# Patient Record
Sex: MALE | Race: WHITE | NOT HISPANIC OR LATINO | Employment: FULL TIME | URBAN - NONMETROPOLITAN AREA
[De-identification: names, ages, dates, MRNs, and addresses within clinical notes are randomized per-mention and may not be internally consistent; named-entity substitution may affect disease eponyms.]

---

## 2018-03-08 ENCOUNTER — DOCUMENTATION ONLY (OUTPATIENT)
Dept: FAMILY MEDICINE | Facility: OTHER | Age: 53
End: 2018-03-08

## 2018-03-08 PROBLEM — H71.90 CHOLESTEATOMA: Status: ACTIVE | Noted: 2018-03-08

## 2018-03-08 PROBLEM — B07.9 VIRAL WARTS: Status: ACTIVE | Noted: 2018-03-08

## 2019-07-16 DIAGNOSIS — Z20.818 PERTUSSIS EXPOSURE: Primary | ICD-10-CM

## 2019-07-16 RX ORDER — AZITHROMYCIN 250 MG/1
TABLET, FILM COATED ORAL
Qty: 6 TABLET | Refills: 0 | Status: SHIPPED | OUTPATIENT
Start: 2019-07-16 | End: 2019-07-21

## 2019-07-16 NOTE — TELEPHONE ENCOUNTER
Stephanie Jasmine requested phone note to get antibiotic to family members exposed to whooping cough. Uses [facility]. Alisha Lopez LPN ....................7/16/2019  1:33 PM

## 2019-07-17 NOTE — TELEPHONE ENCOUNTER
Sent azithromycin to the pharmacy for treatment.   Please verify if he has any medication allergies and document allergies.   Darling Jasmine PA-C..................7/16/2019 9:38 PM

## 2020-07-23 ENCOUNTER — OFFICE VISIT (OUTPATIENT)
Dept: PEDIATRICS | Facility: OTHER | Age: 55
End: 2020-07-23
Attending: INTERNAL MEDICINE
Payer: COMMERCIAL

## 2020-07-23 VITALS
WEIGHT: 249.5 LBS | TEMPERATURE: 98.4 F | RESPIRATION RATE: 16 BRPM | DIASTOLIC BLOOD PRESSURE: 72 MMHG | OXYGEN SATURATION: 95 % | HEART RATE: 88 BPM | BODY MASS INDEX: 32.03 KG/M2 | SYSTOLIC BLOOD PRESSURE: 112 MMHG

## 2020-07-23 DIAGNOSIS — F41.1 GAD (GENERALIZED ANXIETY DISORDER): Primary | ICD-10-CM

## 2020-07-23 DIAGNOSIS — F41.0 PANIC ATTACK: ICD-10-CM

## 2020-07-23 PROCEDURE — 99214 OFFICE O/P EST MOD 30 MIN: CPT | Performed by: INTERNAL MEDICINE

## 2020-07-23 RX ORDER — FLUOXETINE 10 MG/1
10 CAPSULE ORAL DAILY
Qty: 90 CAPSULE | Refills: 3 | Status: SHIPPED | OUTPATIENT
Start: 2020-07-23 | End: 2020-09-17

## 2020-07-23 RX ORDER — LORAZEPAM 0.5 MG/1
0.5 TABLET ORAL DAILY PRN
Qty: 5 TABLET | Refills: 0 | Status: SHIPPED | OUTPATIENT
Start: 2020-07-23 | End: 2021-01-04

## 2020-07-23 ASSESSMENT — ANXIETY QUESTIONNAIRES
IF YOU CHECKED OFF ANY PROBLEMS ON THIS QUESTIONNAIRE, HOW DIFFICULT HAVE THESE PROBLEMS MADE IT FOR YOU TO DO YOUR WORK, TAKE CARE OF THINGS AT HOME, OR GET ALONG WITH OTHER PEOPLE: VERY DIFFICULT
7. FEELING AFRAID AS IF SOMETHING AWFUL MIGHT HAPPEN: NOT AT ALL
1. FEELING NERVOUS, ANXIOUS, OR ON EDGE: NEARLY EVERY DAY
6. BECOMING EASILY ANNOYED OR IRRITABLE: NEARLY EVERY DAY
5. BEING SO RESTLESS THAT IT IS HARD TO SIT STILL: NEARLY EVERY DAY
3. WORRYING TOO MUCH ABOUT DIFFERENT THINGS: SEVERAL DAYS
GAD7 TOTAL SCORE: 14
2. NOT BEING ABLE TO STOP OR CONTROL WORRYING: SEVERAL DAYS

## 2020-07-23 ASSESSMENT — PAIN SCALES - GENERAL: PAINLEVEL: NO PAIN (0)

## 2020-07-23 ASSESSMENT — PATIENT HEALTH QUESTIONNAIRE - PHQ9: 5. POOR APPETITE OR OVEREATING: NEARLY EVERY DAY

## 2020-07-23 NOTE — NURSING NOTE
"Chief Complaint   Patient presents with     Anxiety     Patient is here with concerns about anxiety     Initial /72 (BP Location: Right arm, Patient Position: Sitting, Cuff Size: Adult Large)   Pulse 88   Temp 98.4  F (36.9  C) (Tympanic)   Resp 16   Wt 113.2 kg (249 lb 8 oz)   SpO2 95%   BMI 32.03 kg/m   Estimated body mass index is 32.03 kg/m  as calculated from the following:    Height as of 5/15/13: 1.88 m (6' 2\").    Weight as of this encounter: 113.2 kg (249 lb 8 oz).  Medication Reconciliation: complete    Asuncion Costa MA  "

## 2020-07-23 NOTE — PATIENT INSTRUCTIONS
-- Start fluoxetine   -- Save lorazepam for rare panic attack   -- See a therapist   -- Follow-up if not improving   -- 211 for emergencies    [place] counselors/therapists   Telephone Hours Kids? Address   [facility]  (Many counselors) [phone] M-Th 8-5  F 8-12 Yes [address] [place] [address]   [email or web address]://[email or web address]   [facility]  (Many counselors) [phone]  Yes [address]   [email or web address]   [facility]  (Many counselors) [phone]  Yes [facility] [place]  http://[email or web address]   [facility]  Daniel Barrios [phone]  Yes [facility]  [address]  [email or web address]   [facility]  Juan Palm [phone]  Yes [address] [facility] [address]   [address]  [email or web address]   Payal Grier [phone]   [address]  [email or web address]   [facility]  Jefferson Catherine [phone]   [address] [facility] [address]  https://www.[facility] Journe[facility] Centersy[facility] Hospital[facility].com/   Maren Laura [phone]   [address]   Naomie Arcos [phone]   [address] [place] [address]   Karey Morales [phone]  Yes [address]   Courtney Martines [phone]   [address] [place]    Roberto Castle [phone]   [address] [place] [address]   Aye Regina [phone]   [address]   [address] [place] [address]   [email or web address]://[email or web address].Video [email or web address]   Pippa Guzman [phone]   [address] [place] [address] [address]  ([facility])  [email or web address]   [facility]  Rinku Galvan [phone]   [address] [place] [address] Counseling  Michele Rinne Cindy Thomas [phone]      [facility]  Trevor Brooks, LEONELA [phone] M-F 8-5 Yes [address] [place], [place]  [email or web address]   [facility]: Jd [phone]  Yes MELISSA Miles  [address] [place] [address]  Avenue  [email or web address]://[email or web address]   [facility]: [place] [phone]  Yes [place] [facility]  [email or web address]

## 2020-07-24 PROBLEM — B07.9 VIRAL WARTS: Status: RESOLVED | Noted: 2018-03-08 | Resolved: 2020-07-24

## 2020-07-24 PROBLEM — F41.0 PANIC ATTACK: Status: ACTIVE | Noted: 2020-07-24

## 2020-07-24 PROBLEM — F41.1 GAD (GENERALIZED ANXIETY DISORDER): Status: ACTIVE | Noted: 2020-07-24

## 2020-07-24 ASSESSMENT — ANXIETY QUESTIONNAIRES: GAD7 TOTAL SCORE: 14

## 2020-07-24 NOTE — PROGRESS NOTES
"Subjective  Renzo Mcfarlane is a 54 year old male who presents for discuss anxiety. Has had anxiety for many years. Seems to be worse lately.  Has been putting a stress on his marriage. His anxiety is a \"physical feeling\" where he feels flooding or zapping. It is hard to be in groups or public places.  Has had a handful of panic attacks where he develops presyncope and dyspnea. No heart palpitations. History of alcohol use, sober 6  Years. He thinks he was drinking to treat anxiety. No major life changes this year.    Problem List/PMH: reviewed in EMR, and made relevant updates today.  Medications: reviewed in EMR, and made relevant updates today.  Allergies: reviewed in EMR, and made relevant updates today.    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    Family Hx:   Family History   Problem Relation Age of Onset     Genetic Disorder Other         Genetic,PGF coronary artery disease     Heart Disease Paternal Grandfather         Heart Disease,CAD       Objective  Vitals: reviewed in EMR.  /72 (BP Location: Right arm, Patient Position: Sitting, Cuff Size: Adult Large)   Pulse 88   Temp 98.4  F (36.9  C) (Tympanic)   Resp 16   Wt 113.2 kg (249 lb 8 oz)   SpO2 95%   BMI 32.03 kg/m      Gen: Pleasant male, NAD.  HEENT: MMM  Neck: Supple  Pulm: Breathing easily  Neuro: Grossly intact  Skin: No concerning lesions.  Psychiatric: Normal affect and insight. Does not appear anxious or depressed.    No flowsheet data found.  NATALIIA-7 SCORE 7/23/2020   Total Score 14           Assessment    ICD-10-CM    1. NATALIIA (generalized anxiety disorder)  F41.1 FLUoxetine (PROZAC) 10 MG capsule     LORazepam (ATIVAN) 0.5 MG tablet "   2. Panic attack  F41.0 FLUoxetine (PROZAC) 10 MG capsule     LORazepam (ATIVAN) 0.5 MG tablet     We had a lengthy discussion regarding anxiety and mood.     Plan   -- Expected clinical course discussed   -- Medications and their side effects discussed  Patient Instructions      -- Start fluoxetine   -- Save lorazepam for rare panic attack   -- See a therapist   -- Follow-up if not improving   -- 211 for emergencies    [place] counselors/therapists   Telephone Hours Kids? Address   [facility]  (Many counselors) [phone] M-Th 8-5  F 8-12 Yes [address] [place] [address]   [email or web address]://[email or web address]   [facility]  (Many counselors) [phone]  Yes [address]   [email or web address]://[email or web address] [email or web address]   [facility]  (Many counselors) [phone]  Yes [facility]  http://www.[facility].[email or web address]/   [facility]  Daniel Barrios [phone]  Yes [facility]  [address] [place] [address]  http://[email or web address]/   [facility]  Juan Palm [phone]  Yes [address] [address]   [address]  [email or web address]   Payal Grier [phone]   [address]  [email or web address]   [facility]  Jefferson Catherine [phone]   [address] [facility] [address]  [email or web address]://[email or web address]   Maren Sanchez [phone]   [address]   Naomie Arcos [phone]   [address] [place] [address]   Karey Morales [phone]  Yes [address]   Courtney Martines [phone]   [address] [place]    Roberto Castle [phone]   [address] [place] [address]   Aye Tapia [phone]   10   [address] [place] [address]   http://[email or web address].[facility].The World of [email or web address].[email or web address]   Pippa Guzman [phone]   [address] [place] [address] 7  ([facility])  [email or web address]   [facility]  Rinku Galvan [phone]   [address]  Counseling  Michele Rinne Cindy Thomas [phone]      [facility]  Trevor Brooks, LEONELA [phone] M-F 8-5 Yes [address] [place], [place]  [email or web address]   [facility] Jd [phone]  Yes MELISSA Miles  [address]  http://www.[email or web address]/   [facility]: [place] [phone]  Yes [place]  [address]  http://[email or web address]/           Return if symptoms worsen or fail to improve.    SignedChuck MD, FAAP, FACP  Internal Medicine & Pediatrics    Total time 30 minutes, over half spent counseling and coordinating care regarding anxiety.

## 2020-08-31 ENCOUNTER — MYC MEDICAL ADVICE (OUTPATIENT)
Dept: PEDIATRICS | Facility: OTHER | Age: 55
End: 2020-08-31

## 2020-08-31 ASSESSMENT — ANXIETY QUESTIONNAIRES
GAD7 TOTAL SCORE: 10
5. BEING SO RESTLESS THAT IT IS HARD TO SIT STILL: SEVERAL DAYS
GAD7 TOTAL SCORE: 10
1. FEELING NERVOUS, ANXIOUS, OR ON EDGE: NEARLY EVERY DAY
4. TROUBLE RELAXING: NEARLY EVERY DAY
6. BECOMING EASILY ANNOYED OR IRRITABLE: SEVERAL DAYS
7. FEELING AFRAID AS IF SOMETHING AWFUL MIGHT HAPPEN: NOT AT ALL
3. WORRYING TOO MUCH ABOUT DIFFERENT THINGS: SEVERAL DAYS
7. FEELING AFRAID AS IF SOMETHING AWFUL MIGHT HAPPEN: NOT AT ALL
2. NOT BEING ABLE TO STOP OR CONTROL WORRYING: SEVERAL DAYS

## 2020-09-01 ASSESSMENT — ANXIETY QUESTIONNAIRES: GAD7 TOTAL SCORE: 10

## 2020-09-01 NOTE — TELEPHONE ENCOUNTER
-- See if he has seen a therapist   -- Schedule OV follow-up anxiety as previously planned    Signed, Chuck Galaviz MD, FAAP, FACP  Internal Medicine & Pediatrics

## 2020-09-17 ENCOUNTER — OFFICE VISIT (OUTPATIENT)
Dept: PEDIATRICS | Facility: OTHER | Age: 55
End: 2020-09-17
Attending: INTERNAL MEDICINE
Payer: COMMERCIAL

## 2020-09-17 VITALS
OXYGEN SATURATION: 97 % | BODY MASS INDEX: 30.85 KG/M2 | TEMPERATURE: 99.2 F | HEART RATE: 65 BPM | HEIGHT: 75 IN | SYSTOLIC BLOOD PRESSURE: 126 MMHG | WEIGHT: 248.1 LBS | RESPIRATION RATE: 16 BRPM | DIASTOLIC BLOOD PRESSURE: 78 MMHG

## 2020-09-17 DIAGNOSIS — F40.243 FLYING PHOBIA: ICD-10-CM

## 2020-09-17 DIAGNOSIS — F41.1 GAD (GENERALIZED ANXIETY DISORDER): Primary | ICD-10-CM

## 2020-09-17 DIAGNOSIS — F41.0 PANIC ATTACK: ICD-10-CM

## 2020-09-17 DIAGNOSIS — Z23 NEED FOR PROPHYLACTIC VACCINATION AND INOCULATION AGAINST INFLUENZA: ICD-10-CM

## 2020-09-17 PROCEDURE — 99213 OFFICE O/P EST LOW 20 MIN: CPT | Mod: 25 | Performed by: INTERNAL MEDICINE

## 2020-09-17 PROCEDURE — 90715 TDAP VACCINE 7 YRS/> IM: CPT | Performed by: INTERNAL MEDICINE

## 2020-09-17 PROCEDURE — 90686 IIV4 VACC NO PRSV 0.5 ML IM: CPT | Performed by: INTERNAL MEDICINE

## 2020-09-17 PROCEDURE — 90472 IMMUNIZATION ADMIN EACH ADD: CPT | Performed by: INTERNAL MEDICINE

## 2020-09-17 PROCEDURE — 90471 IMMUNIZATION ADMIN: CPT | Performed by: INTERNAL MEDICINE

## 2020-09-17 ASSESSMENT — ANXIETY QUESTIONNAIRES
6. BECOMING EASILY ANNOYED OR IRRITABLE: NOT AT ALL
2. NOT BEING ABLE TO STOP OR CONTROL WORRYING: MORE THAN HALF THE DAYS
1. FEELING NERVOUS, ANXIOUS, OR ON EDGE: NEARLY EVERY DAY
7. FEELING AFRAID AS IF SOMETHING AWFUL MIGHT HAPPEN: NOT AT ALL
5. BEING SO RESTLESS THAT IT IS HARD TO SIT STILL: SEVERAL DAYS
GAD7 TOTAL SCORE: 10
3. WORRYING TOO MUCH ABOUT DIFFERENT THINGS: MORE THAN HALF THE DAYS

## 2020-09-17 ASSESSMENT — PAIN SCALES - GENERAL: PAINLEVEL: NO PAIN (0)

## 2020-09-17 ASSESSMENT — PATIENT HEALTH QUESTIONNAIRE - PHQ9: 5. POOR APPETITE OR OVEREATING: MORE THAN HALF THE DAYS

## 2020-09-17 ASSESSMENT — MIFFLIN-ST. JEOR: SCORE: 2056

## 2020-09-17 NOTE — NURSING NOTE
"Chief Complaint   Patient presents with     Anxiety     Follow up      Recheck Medication     Patient is here for a follow up on anxiety. He feels Fluoxetine is working but the dose could be increased.     Initial /78 (BP Location: Right arm, Patient Position: Sitting, Cuff Size: Adult Large)   Pulse 65   Temp 99.2  F (37.3  C) (Tympanic)   Resp 16   Ht 1.905 m (6' 3\")   Wt 112.5 kg (248 lb 1.6 oz)   SpO2 97%   BMI 31.01 kg/m   Estimated body mass index is 31.01 kg/m  as calculated from the following:    Height as of this encounter: 1.905 m (6' 3\").    Weight as of this encounter: 112.5 kg (248 lb 1.6 oz).  Medication Reconciliation: complete    Asuncion Costa MA     Immunization Documentation    Prior to Immunization administration, verified patients identity using patient's name and date of birth. Please see IMMUNIZATIONS  and order for additional information.  Patient / Parent instructed to remain in clinic for 15 minutes and report any adverse reaction to staff immediately.    Was the entire amount of vaccines given used? Yes    Asuncion Costa MA  9/17/2020   5:00 PM    "

## 2020-09-17 NOTE — PROGRESS NOTES
"Subjective  Renzo Mcfarlane is a 54 year old male who presents for follow-up anxiety.  Since our last visit this summer he has been taking fluoxetine 10 mg daily.  He feels like it is really starting to help.  He does not feel as much anxiety and is able to handle it better when it comes around.  He has not had to use any of the lorazepam, but does feel grateful he has had some pills to use if absolutely needed.  He is wondering if he could use 1 of these for flight phobia.  Typically he avoided flying because it gave him too much anxiety so he would drive the 18 hours across the country to see his son.  He has had some waves of anxiety for which he is able to pause and let past.  He has not seen a therapist.  He continues to work on his sobriety.    Problem List/PMH: reviewed in EMR, and made relevant updates today.  Medications: reviewed in EMR, and made relevant updates today.  Allergies: reviewed in EMR, and made relevant updates today.    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    Family Hx:   Family History   Problem Relation Age of Onset     Genetic Disorder Other         Genetic,PGF coronary artery disease     Heart Disease Paternal Grandfather         Heart Disease,CAD       Objective  Vitals: reviewed in EMR.  /78 (BP Location: Right arm, Patient Position: Sitting, Cuff Size: Adult Large)   Pulse 65   Temp 99.2  F (37.3  C) (Tympanic)   Resp 16   Ht 1.905 m (6' 3\")   Wt 112.5 kg (248 lb 1.6 oz)   SpO2 97%   BMI 31.01 kg/m      Gen: Pleasant male, NAD.  HEENT: MMM  Neck: Supple  Pulm: Breathing easily  Neuro: Grossly intact  Skin: No concerning lesions.  Psychiatric: Normal affect and insight. " Does not appear anxious or depressed.    No flowsheet data found.  NATALIIA-7 SCORE 7/23/2020 8/31/2020   Total Score - 10 (moderate anxiety)   Total Score 14 10         Assessment    ICD-10-CM    1. NATALIIA (generalized anxiety disorder)  F41.1 FLUoxetine (PROZAC) 20 MG capsule   2. Need for prophylactic vaccination and inoculation against influenza  Z23 INFLUENZA VACCINE IM > 6 MONTHS VALENT IIV4 [96409]     CANCELED: INFLUENZA QUAD, RECOMBINANT, P-FREE (RIV4) (FLUBLOCK) [80366]   3. Panic attack  F41.0 FLUoxetine (PROZAC) 20 MG capsule   4. 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  We discussed options and decided to increase fluoxetine dose.  If he were to use a dose of lorazepam for flight phobia I just asked that he let us know that he did that.  I encouraged him to consider seeing a therapist.  I recommend daily physical exercise.    Plan   -- Expected clinical course discussed   -- Medications and their side effects discussed   --Follow-up as needed    Signed, Chuck Galaviz MD, FAAP, FACP  Internal Medicine & Pediatrics

## 2020-09-22 ASSESSMENT — ANXIETY QUESTIONNAIRES: GAD7 TOTAL SCORE: 10

## 2020-12-03 ENCOUNTER — TELEPHONE (OUTPATIENT)
Dept: PEDIATRICS | Facility: OTHER | Age: 55
End: 2020-12-03

## 2020-12-03 ENCOUNTER — VIRTUAL VISIT (OUTPATIENT)
Dept: PEDIATRICS | Facility: OTHER | Age: 55
End: 2020-12-03
Attending: INTERNAL MEDICINE
Payer: COMMERCIAL

## 2020-12-03 VITALS
HEART RATE: 71 BPM | HEIGHT: 75 IN | SYSTOLIC BLOOD PRESSURE: 130 MMHG | DIASTOLIC BLOOD PRESSURE: 80 MMHG | BODY MASS INDEX: 30.71 KG/M2 | WEIGHT: 247 LBS

## 2020-12-03 DIAGNOSIS — H91.93 BILATERAL HEARING LOSS, UNSPECIFIED HEARING LOSS TYPE: Primary | ICD-10-CM

## 2020-12-03 DIAGNOSIS — H71.91 CHOLESTEATOMA OF RIGHT EAR: ICD-10-CM

## 2020-12-03 PROCEDURE — 99212 OFFICE O/P EST SF 10 MIN: CPT | Mod: TEL | Performed by: INTERNAL MEDICINE

## 2020-12-03 ASSESSMENT — PAIN SCALES - GENERAL: PAINLEVEL: NO PAIN (0)

## 2020-12-03 ASSESSMENT — MIFFLIN-ST. JEOR: SCORE: 2046.01

## 2020-12-03 NOTE — PROGRESS NOTES
"Renzo Mcfarlane is a 55 year old male who is being evaluated via a billable telephone visit.      The patient has been notified of following:     \"This telephone visit will be conducted via a call between you and your physician/provider. We have found that certain health care needs can be provided without the need for a physical exam.  This service lets us provide the care you need with a short phone conversation.  If a prescription is necessary we can send it directly to your pharmacy.  If lab work is needed we can place an order for that and you can then stop by our lab to have the test done at a later time.    Telephone visits are billed at different rates depending on your insurance coverage. During this emergency period, for some insurers they may be billed the same as an in-person visit.  Please reach out to your insurance provider with any questions.    If during the course of the call the physician/provider feels a telephone visit is not appropriate, you will not be charged for this service.\"    Patient has given verbal consent for Telephone visit?  Yes    What phone number would you like to be contacted at? [phone]    How would you like to obtain your AVS? MyChart    Subjective     Renzo Mcfarlane is a 55 year old male who presents via phone visit today for the following health issues:    Wants hearing aids  Has been shopping around  Ended up at [facility]  Had some wax inside  Had some reconstructive surgery in the ear age 10-12 years old, had to go behind the membrane and rebuild a bone (?stirrip)    Review of Systems   Constitutional, HEENT, cardiovascular, pulmonary, gi and gu systems are negative, except as otherwise noted.       Objective   Vitals - Patient Reported  Pain Score: No Pain (0)        healthy, alert and no distress  PSYCH: Alert and oriented times 3; coherent speech, normal   rate and volume, able to articulate logical thoughts, able   to abstract reason, no tangential thoughts, no " hallucinations   or delusions  His affect is normal  RESP: No cough, no audible wheezing, able to talk in full sentences  Remainder of exam unable to be completed due to telephone visits            Assessment/Plan:      ICD-10-CM    1. Bilateral hearing loss, unspecified hearing loss type  H91.93    2. Cholesteatoma of right ear  H71.91      He is describing that he flushed his own external canals of cerumen, using a video device.  I was not able to visualize his tympanic membranes today since this is a telephone visit.  It is possible he has a recurrence of his cholesteatoma although it has been many years.  I think it is reasonable to proceed with hearing aids.     -- Patient performed self flushing of cerumen at home   -- Okay to proceed with audiology and hearing aids   -- If ongoing concerns, would refer to ENT    Phone call duration:  9 minutes

## 2020-12-03 NOTE — NURSING NOTE
"Chief Complaint   Patient presents with     Forms     Patient went to [facility] for hearing test. They are requesting a form be signed by provider/ doctor so he can get hearing aids. They want the form signed since he's had surgery in the past on his ears.     Initial There were no vitals taken for this visit. Estimated body mass index is 31.01 kg/m  as calculated from the following:    Height as of 9/17/20: 1.905 m (6' 3\").    Weight as of 9/17/20: 112.5 kg (248 lb 1.6 oz).  Medication Reconciliation: complete    Asuncion Costa MA  "

## 2020-12-03 NOTE — TELEPHONE ENCOUNTER
Patient called and was wondering if he could schedule a virtual visit or could drop off some paperwork for [facility] without scheduling an in person appointment. Paperwork pertains to his hearing and wasn't sure if [facility] needed to take a look before signing off on future hearing aids. Would virtual visit be appropriate or would he rather see patient in office? Patient is scheduled as of now for today, 12/3/20 at 4:00PM. Please advise.       Maren Jordan on 12/3/2020 at 8:04 AM

## 2020-12-03 NOTE — TELEPHONE ENCOUNTER
Video visit should be just great.    Signed, Chuck Galaviz MD, FAAP, FACP  Internal Medicine & Pediatrics

## 2020-12-03 NOTE — TELEPHONE ENCOUNTER
Patient contacted and informed appointment can be done via video. Patient agreeable. Appointment switched from in-clinic visit to video.     Asuncion Costa CMA on 12/3/2020 at 9:36 AM

## 2020-12-27 ENCOUNTER — HEALTH MAINTENANCE LETTER (OUTPATIENT)
Age: 55
End: 2020-12-27

## 2021-01-04 ENCOUNTER — MYC REFILL (OUTPATIENT)
Dept: PEDIATRICS | Facility: OTHER | Age: 56
End: 2021-01-04

## 2021-01-04 DIAGNOSIS — F41.1 GAD (GENERALIZED ANXIETY DISORDER): ICD-10-CM

## 2021-01-04 DIAGNOSIS — F41.0 PANIC ATTACK: ICD-10-CM

## 2021-01-05 RX ORDER — LORAZEPAM 0.5 MG/1
0.5 TABLET ORAL DAILY PRN
Qty: 5 TABLET | Refills: 0 | Status: SHIPPED | OUTPATIENT
Start: 2021-01-05 | End: 2021-04-27

## 2021-06-04 ENCOUNTER — MYC MEDICAL ADVICE (OUTPATIENT)
Dept: PEDIATRICS | Facility: OTHER | Age: 56
End: 2021-06-04

## 2021-06-04 DIAGNOSIS — F41.1 GAD (GENERALIZED ANXIETY DISORDER): ICD-10-CM

## 2021-06-04 DIAGNOSIS — F41.0 PANIC ATTACK: ICD-10-CM

## 2021-06-04 NOTE — TELEPHONE ENCOUNTER
Okay for refill. He should schedule an annual physical.    Signed, Chuck Galaviz MD, FAAP, FACP  Internal Medicine & Pediatrics

## 2021-06-05 NOTE — TELEPHONE ENCOUNTER
Patient notified of message, will call and schedule an appointment to be seen  Shira Castle LPN.......6/5/2021 12:06 PM

## 2021-06-21 ENCOUNTER — OFFICE VISIT (OUTPATIENT)
Dept: PEDIATRICS | Facility: OTHER | Age: 56
End: 2021-06-21
Attending: INTERNAL MEDICINE
Payer: COMMERCIAL

## 2021-06-21 VITALS
RESPIRATION RATE: 20 BRPM | DIASTOLIC BLOOD PRESSURE: 70 MMHG | WEIGHT: 253 LBS | TEMPERATURE: 98.6 F | BODY MASS INDEX: 31.46 KG/M2 | SYSTOLIC BLOOD PRESSURE: 122 MMHG | HEART RATE: 68 BPM | HEIGHT: 75 IN

## 2021-06-21 DIAGNOSIS — F39 MOOD DISORDER (H): ICD-10-CM

## 2021-06-21 DIAGNOSIS — L81.9 ATYPICAL PIGMENTED SKIN LESION: ICD-10-CM

## 2021-06-21 DIAGNOSIS — F40.243 FLYING PHOBIA: ICD-10-CM

## 2021-06-21 DIAGNOSIS — F41.1 GAD (GENERALIZED ANXIETY DISORDER): Primary | ICD-10-CM

## 2021-06-21 DIAGNOSIS — F41.0 PANIC ATTACK: ICD-10-CM

## 2021-06-21 PROCEDURE — 99213 OFFICE O/P EST LOW 20 MIN: CPT | Performed by: INTERNAL MEDICINE

## 2021-06-21 RX ORDER — LORAZEPAM 0.5 MG/1
0.5 TABLET ORAL DAILY PRN
Qty: 5 TABLET | Refills: 0 | Status: SHIPPED | OUTPATIENT
Start: 2021-06-21 | End: 2021-08-27

## 2021-06-21 ASSESSMENT — MIFFLIN-ST. JEOR: SCORE: 2073.23

## 2021-06-21 ASSESSMENT — PATIENT HEALTH QUESTIONNAIRE - PHQ9: SUM OF ALL RESPONSES TO PHQ QUESTIONS 1-9: 2

## 2021-06-21 NOTE — PROGRESS NOTES
"Subjective   Renzo Mcfarlane is a 55 year old male who presents for med check.  He is doing much better.  He feels like the fluoxetine 20 mg is a very good dose for him.  There were a few occasions where he could not remember if he took the pill so he took a second dose and this made him quite drowsy.  He takes this as a sign that the 20 mg is a good dose for him.  He very rarely needs to use a dose of lorazepam most notably for flying phobia.  He has used it for panic attacks.  He has a spot on the top of the head he would like me to look at    Objective   Vitals: /70 (BP Location: Right arm, Patient Position: Sitting, Cuff Size: Adult Large)   Pulse 68   Temp 98.6  F (37  C) (Tympanic)   Resp 20   Ht 1.905 m (6' 3\")   Wt 114.8 kg (253 lb)   BMI 31.62 kg/m      Skin: Left vertex scalp nodular papule approximately 1 cm in diameter    Review and Analysis of Data   I personally reviewed the following:  External notes: No  Results: No  Use of an independent historian: No  Independent review of a test performed by another physician: No  Discussion of management with another physician: No  Moderate risk of morbidity from additional diagnostic testing and/or treatment.    Assessment & Plan   1. NATALIIA (generalized anxiety disorder)  At goal, no change.  - FLUoxetine (PROZAC) 20 MG capsule; Take 1 capsule (20 mg) by mouth daily  Dispense: 90 capsule; Refill: 3    2. Mood disorder (H)  At goal, no change.    3. Flying phobia  4. Panic attack  I strongly encourage minimizing the use of benzodiazepines.  I did refill the lorazepam No. 5 today.  If he starts requesting more of these we discussed how I would recommend establishing with a therapist and want to consider increasing or changing his SSRI.      5. Atypical pigmented skin lesion  I think the spot on the top of his head is a basal cell carcinoma however differential diagnosis also includes inflamed seborrheic keratosis, others.  - GENERAL SURG ADULT " REFERRAL; Future      Signed, Chuck Galaviz MD, FAAP, FACP  Internal Medicine & Pediatrics

## 2021-06-21 NOTE — NURSING NOTE
"Chief Complaint   Patient presents with     Recheck Medication   Patient is here to follow up on medications.     Initial /70 (BP Location: Right arm, Patient Position: Sitting, Cuff Size: Adult Large)   Pulse 68   Temp 98.6  F (37  C) (Tympanic)   Resp 20   Ht 1.905 m (6' 3\")   Wt 114.8 kg (253 lb)   BMI 31.62 kg/m   Estimated body mass index is 31.62 kg/m  as calculated from the following:    Height as of this encounter: 1.905 m (6' 3\").    Weight as of this encounter: 114.8 kg (253 lb).  Medication Reconciliation: complete    Alisha Lopze LPN  "

## 2021-07-12 ENCOUNTER — OFFICE VISIT (OUTPATIENT)
Dept: SURGERY | Facility: OTHER | Age: 56
End: 2021-07-12
Attending: INTERNAL MEDICINE
Payer: COMMERCIAL

## 2021-07-12 VITALS
DIASTOLIC BLOOD PRESSURE: 70 MMHG | HEIGHT: 74 IN | TEMPERATURE: 96.9 F | RESPIRATION RATE: 16 BRPM | BODY MASS INDEX: 32.73 KG/M2 | SYSTOLIC BLOOD PRESSURE: 138 MMHG | WEIGHT: 255 LBS | HEART RATE: 64 BPM

## 2021-07-12 DIAGNOSIS — L81.9 ATYPICAL PIGMENTED SKIN LESION: Primary | ICD-10-CM

## 2021-07-12 PROCEDURE — 88305 TISSUE EXAM BY PATHOLOGIST: CPT

## 2021-07-12 PROCEDURE — 11621 EXC S/N/H/F/G MAL+MRG 0.6-1: CPT | Performed by: SURGERY

## 2021-07-12 ASSESSMENT — PAIN SCALES - GENERAL: PAINLEVEL: NO PAIN (0)

## 2021-07-12 ASSESSMENT — MIFFLIN-ST. JEOR: SCORE: 2058.48

## 2021-07-12 NOTE — PATIENT INSTRUCTIONS
Your incision was closed with stitches that will dissolve.      The stitches will take about 6 weeks to fully dissolve.      It is ok to get the incision wet in the shower on the day after your procedure.     Don't soak in a tub, pool or lake for 5 days.

## 2021-07-12 NOTE — PROGRESS NOTES
Procedure Note     Pre/Post Operative Diagnosis:   scalp lesion    Procedure:    Excision of scalp lesion    Surgeon: NATALY Baires MD     Local Anesthesia: 1% lidocaine with0.25%Marcaine with epinephrine    Indication for the procedure:    This is a 55 year old male patient with scalp lesion.  Scab lesion found by primary care provider during physical exam.  Patient has no previous history of skin cancer.  Patient has a history of vitiligo and is diligent with sun protection.  Patient has lots of hair on the top of his head and usually wears a hat when he is outdoors.  Clinically, there is a 7 mm x 8 mm slightly nodular, raised pearlescent lesion on the left parietal scalp.  After explaining the risks to include bleeding, infection, recurrence or need for re-excision, and scarring the patient wished to proceed.    Procedure:   The area was prepped and draped in usual sterile fashion with ChloraPrep. After adequate local anesthesia, an elliptical skin incision was made to encompass the lesion, 2.1 cm x 0.9 cm with margins.  Skin is closed with 3-0 running chromic gut suture.    Plan:  The patient will be called with pathology results.  Patient will followup if there any problems with the wound including redness or drainage.      NATALY Baires MD

## 2021-07-12 NOTE — NURSING NOTE
"Chief Complaint   Patient presents with     Procedure     skin lesion on top of head       Initial /70 (BP Location: Right arm, Patient Position: Sitting, Cuff Size: Adult Regular)   Pulse 64   Temp 96.9  F (36.1  C) (Tympanic)   Resp 16   Ht 1.867 m (6' 1.5\")   Wt 115.7 kg (255 lb)   BMI 33.19 kg/m   Estimated body mass index is 33.19 kg/m  as calculated from the following:    Height as of this encounter: 1.867 m (6' 1.5\").    Weight as of this encounter: 115.7 kg (255 lb).  Medication Reconciliation: Completed     Prior to the start of the procedure and with procedural staff participation, I verbally confirmed the patient s identity using two indicators, relevant allergies, that the procedure was appropriate and matched the consent or emergent situation, and that the correct equipment/implants were available. Immediately prior to starting the procedure I conducted the Time Out with the procedural staff and re-confirmed the patient s name, procedure, and site/side. (The Joint Commission universal protocol was followed.)  Yes    Sedation (Moderate or Deep): None    Malka Marie LPN  "

## 2021-07-20 ENCOUNTER — MYC MEDICAL ADVICE (OUTPATIENT)
Dept: SURGERY | Facility: OTHER | Age: 56
End: 2021-07-20

## 2021-07-20 ENCOUNTER — TELEPHONE (OUTPATIENT)
Dept: SURGERY | Facility: OTHER | Age: 56
End: 2021-07-20

## 2021-07-20 LAB
PATH REPORT.COMMENTS IMP SPEC: NORMAL
PATH REPORT.FINAL DX SPEC: NORMAL
PHOTO IMAGE: NORMAL

## 2021-07-20 NOTE — TELEPHONE ENCOUNTER
Patient looking for pathology results from procedure on 7/12/21.  Malka Marie LPN........................7/20/2021  4:35 PM

## 2021-08-27 ENCOUNTER — MYC REFILL (OUTPATIENT)
Dept: PEDIATRICS | Facility: OTHER | Age: 56
End: 2021-08-27

## 2021-08-27 DIAGNOSIS — F40.243 FLYING PHOBIA: ICD-10-CM

## 2021-08-30 RX ORDER — LORAZEPAM 0.5 MG/1
0.5 TABLET ORAL DAILY PRN
Qty: 5 TABLET | Refills: 0 | Status: SHIPPED | OUTPATIENT
Start: 2021-08-30 | End: 2021-11-22

## 2021-10-09 ENCOUNTER — HEALTH MAINTENANCE LETTER (OUTPATIENT)
Age: 56
End: 2021-10-09

## 2021-11-22 ENCOUNTER — MYC REFILL (OUTPATIENT)
Dept: PEDIATRICS | Facility: OTHER | Age: 56
End: 2021-11-22
Payer: COMMERCIAL

## 2021-11-22 DIAGNOSIS — F40.243 FLYING PHOBIA: ICD-10-CM

## 2021-11-22 DIAGNOSIS — F39 MOOD DISORDER (H): ICD-10-CM

## 2021-11-22 DIAGNOSIS — F41.1 GAD (GENERALIZED ANXIETY DISORDER): Primary | ICD-10-CM

## 2021-11-22 DIAGNOSIS — F41.0 PANIC ATTACK: ICD-10-CM

## 2021-11-22 RX ORDER — LORAZEPAM 0.5 MG/1
0.5 TABLET ORAL DAILY PRN
Qty: 5 TABLET | Refills: 0 | Status: SHIPPED | OUTPATIENT
Start: 2021-11-22 | End: 2022-02-14

## 2021-11-22 NOTE — TELEPHONE ENCOUNTER
ICD-10-CM    1. NATALIIA (generalized anxiety disorder)  F41.1    2. Flying phobia  F40.243 LORazepam (ATIVAN) 0.5 MG tablet   3. Panic attack  F41.0    4. Mood disorder (H)  F39       Orders Placed This Encounter   Medications     LORazepam (ATIVAN) 0.5 MG tablet     Sig: Take 1 tablet (0.5 mg) by mouth daily as needed (flying phobia)     Dispense:  5 tablet     Refill:  0      -- Schedule OV follow-up anxiety.    Signed, Chuck Galaviz MD, FAAP, FACP  Internal Medicine & Pediatrics

## 2022-01-29 ENCOUNTER — HEALTH MAINTENANCE LETTER (OUTPATIENT)
Age: 57
End: 2022-01-29

## 2022-02-14 ENCOUNTER — OFFICE VISIT (OUTPATIENT)
Dept: PEDIATRICS | Facility: OTHER | Age: 57
End: 2022-02-14
Attending: INTERNAL MEDICINE
Payer: COMMERCIAL

## 2022-02-14 VITALS
OXYGEN SATURATION: 93 % | TEMPERATURE: 97.3 F | DIASTOLIC BLOOD PRESSURE: 74 MMHG | HEART RATE: 70 BPM | RESPIRATION RATE: 20 BRPM | WEIGHT: 261 LBS | BODY MASS INDEX: 33.97 KG/M2 | SYSTOLIC BLOOD PRESSURE: 126 MMHG

## 2022-02-14 DIAGNOSIS — F41.0 PANIC ATTACK: ICD-10-CM

## 2022-02-14 DIAGNOSIS — F40.243 FLYING PHOBIA: ICD-10-CM

## 2022-02-14 DIAGNOSIS — F41.1 GAD (GENERALIZED ANXIETY DISORDER): ICD-10-CM

## 2022-02-14 DIAGNOSIS — Z23 NEED FOR VACCINATION: Primary | ICD-10-CM

## 2022-02-14 PROCEDURE — 90682 RIV4 VACC RECOMBINANT DNA IM: CPT | Performed by: INTERNAL MEDICINE

## 2022-02-14 PROCEDURE — 99213 OFFICE O/P EST LOW 20 MIN: CPT | Mod: 25 | Performed by: INTERNAL MEDICINE

## 2022-02-14 PROCEDURE — 90471 IMMUNIZATION ADMIN: CPT | Performed by: INTERNAL MEDICINE

## 2022-02-14 RX ORDER — LORAZEPAM 0.5 MG/1
0.5 TABLET ORAL DAILY PRN
Qty: 10 TABLET | Refills: 0 | Status: SHIPPED | OUTPATIENT
Start: 2022-02-14 | End: 2022-06-23

## 2022-02-14 ASSESSMENT — ANXIETY QUESTIONNAIRES
4. TROUBLE RELAXING: SEVERAL DAYS
3. WORRYING TOO MUCH ABOUT DIFFERENT THINGS: NOT AT ALL
GAD7 TOTAL SCORE: 2
GAD7 TOTAL SCORE: 2
6. BECOMING EASILY ANNOYED OR IRRITABLE: NOT AT ALL
7. FEELING AFRAID AS IF SOMETHING AWFUL MIGHT HAPPEN: NOT AT ALL
1. FEELING NERVOUS, ANXIOUS, OR ON EDGE: SEVERAL DAYS
2. NOT BEING ABLE TO STOP OR CONTROL WORRYING: NOT AT ALL
7. FEELING AFRAID AS IF SOMETHING AWFUL MIGHT HAPPEN: NOT AT ALL
5. BEING SO RESTLESS THAT IT IS HARD TO SIT STILL: NOT AT ALL

## 2022-02-14 ASSESSMENT — PAIN SCALES - GENERAL: PAINLEVEL: NO PAIN (0)

## 2022-02-14 NOTE — NURSING NOTE
"Patient presents to the clinic for medication refill.    FOOD SECURITY SCREENING QUESTIONS:    The next two questions are to help us understand your food security.  If you are feeling you need any assistance in this area, we have resources available to support you today.    Hunger Vital Signs:  Within the past 12 months we worried whether our food would run out before we got money to buy more. Never  Within the past 12 months the food we bought just didn't last and we didn't have money to get more. Never    Advance Care Directive on file? no  Advance Care Directive provided to patient? Yes.  Chief Complaint   Patient presents with     Recheck Medication       Initial /74 (BP Location: Right arm, Patient Position: Sitting, Cuff Size: Adult Large)   Pulse 70   Temp 97.3  F (36.3  C) (Tympanic)   Resp 20   Wt 118.4 kg (261 lb)   SpO2 93%   BMI 33.97 kg/m   Estimated body mass index is 33.97 kg/m  as calculated from the following:    Height as of 7/12/21: 1.867 m (6' 1.5\").    Weight as of this encounter: 118.4 kg (261 lb).  Medication Reconciliation: complete        Winsome Mckeon LPN       "

## 2022-02-14 NOTE — PROGRESS NOTES
Assessment & Plan   1. Flying phobia  2. NATALIIA (generalized anxiety disorder)  3. Panic attack  Overall his mood is significantly improved.  Recommend continue fluoxetine.  Very rare use of lorazepam is acceptable although we did discuss how working on trying to eliminate its use would be ideal.  I encouraged him to establish with a therapist as I believe cognitive behavioral therapy and biofeedback would be very helpful for him.  - FLUoxetine (PROZAC) 20 MG capsule; Take 1 capsule (20 mg) by mouth daily  Dispense: 90 capsule; Refill: 3    4. Need for vaccination  - INFLUENZA QUAD, RECOMBINANT, P-FREE (RIV4) (FLUBLOK)      Patient Instructions      -- Try to minimize use of lorazepam   -- Continue fluoxetine   -- You should see a therapist for CBT    [place] counselors/therapists   Telephone Hours Kids? Address   [facility]  (Many counselors) [phone] M-Th 8-5  F 8-12 Yes [address] [place] [address]   [email or web address]://[email or web address]   [facility]  (Many counselors) [phone]  Yes [address] [facility] [address]   [email or web address]://[email or web address]   [facility]  (Many counselors) [phone]  Yes [address] [place] [address]  [email or web address]://[email or web address]   [facility]  Daniel Barrios [phone]  Yes [facility]  [address]  [email or web address]   [facility]  Juan Palm [phone]  Yes [address]   [address]  [email or web address]://[email or web address]   Payal Grier [phone]   [address]  [email or web address]   [facility]  Jefferson Catherine [phone]   [address] [facility] [address]  https://www.[facility]GreenMantra TechnologiesAzul SystemsParkview Health Montpelier HospitalFilmLoop.com/   Maren Sanchez [phone]   [address]   Naomie Arcos [phone]   [address] [place] [address]   Karey Morales [phone]  Yes [address]   Courtney Martines [phone] [address] [place]    Roberto Castle [phone]   [address]   Aye Tapia [phone]   [address]   [address] [place], [place] [address]   [email or web address]://[email or web address]   Pippa Guzman [phone]   [address] [place] [address] [address]  ([facility])  [email or web address]   [facility]  Rinku Galvan [phone]   [address] [facility]  Michele Rinne Cindy Thomas [phone]      [facility] [place] [phone]  Yes MELISSA Miles  [address]  [email or web address]   [facility]: [place] [phone]  Yes MELISSA Hough  [address]  [email or web address]           Return if symptoms worsen or fail to improve.    Signed, Chuck Galaviz MD, FAAP, FACP  Internal Medicine & Pediatrics    Subjective   Renzo Mcfarlane is a 56 year old male who presents for medication management.  He feels like overall his mental health is going very well lately.  He believes the fluoxetine is helping him a lot.  He very rarely needs to use a dose of lorazepam typically for flight phobia or possibly a panic attack.  He keeps 1 dose of lorazepam on his person and 1 at his desk at work in case a crisis would occur.  He has only had to use a handful of doses in the past year.  He has been flying more because his son is a college  this year.  He does not want to see a therapist because \"I know them all.\"    Objective   Vitals: /74 (BP Location: Right arm, Patient Position: Sitting, Cuff Size: Adult Large)   Pulse 70   Temp 97.3  F (36.3  C) (Tympanic)   Resp 20   Wt 118.4 kg (261 lb)   SpO2 93%   BMI 33.97 kg/m      Psych: normal affect    "

## 2022-02-14 NOTE — PATIENT INSTRUCTIONS
-- Try to minimize use of lorazepam   -- Continue fluoxetine   -- You should see a therapist for CBT    [place] counselors/therapists   Telephone Hours Kids? Address   [facility]  (Many counselors) [phone] M-Th 8-5  F 8-12 Yes [address] [place] [address]   [email or web address]://[email or web address]   [facility]  (Many counselors) [phone]  Yes [address]   [email or web address]://[email or web address]   [facility]  (Many counselors) [phone]  Yes [address]  [email or web address]://[email or web address]   [facility]  Daniel Barrios [phone]  Yes [facility]  [address] [place] [address], [address]  [email or web address]   [facility]  Juan Palm [phone]  Yes [address] [facility] [address]   [address]  http://[email or web address]/   Payal Grier [phone]   [address] [facility] [address]  [email or web address]   [facility]  Jefferson Catherine [phone]   [address] [facility] [address]  https://[email or web address].[email or web address] EntertainmentTuscarawas HospitalZtory.com/   Maren Laura [phone]   [address]   Naomie Arcos [phone]   [address] [place] [address]   Karey Morales [phone]  Yes [address]   Courtney Martines [phone]   [address] [place]    Roberto Castle [phone]   [address] [place] [address]   Aye Tapia [phone]   10   [address] [place] [address]   [email or web address]://[email or web address].[email or web address]   Pippa Guzman [phone]   [address] [place] [address] [address]  ([facility])  [email or web address]   [facility]  Rinku Galvan [phone]   [address] [place] [address] Counseling  Michele Rinne Cindy Thomas [phone]      [facility] [place] [phone]  Yes MELISSA Miles  [address]  http://www.[facility].org/   [facility] [place] [phone]  Yes MELISSA Hough  [address] [place] [address]  http://www.[facility].org/

## 2022-02-15 ASSESSMENT — ANXIETY QUESTIONNAIRES: GAD7 TOTAL SCORE: 2

## 2022-06-23 ENCOUNTER — MYC REFILL (OUTPATIENT)
Dept: PEDIATRICS | Facility: OTHER | Age: 57
End: 2022-06-23

## 2022-06-23 DIAGNOSIS — F40.243 FLYING PHOBIA: ICD-10-CM

## 2022-06-24 RX ORDER — LORAZEPAM 0.5 MG/1
0.5 TABLET ORAL DAILY PRN
Qty: 10 TABLET | Refills: 0 | Status: SHIPPED | OUTPATIENT
Start: 2022-06-24 | End: 2024-04-11

## 2022-09-17 ENCOUNTER — HEALTH MAINTENANCE LETTER (OUTPATIENT)
Age: 57
End: 2022-09-17

## 2022-10-03 ENCOUNTER — MYC MEDICAL ADVICE (OUTPATIENT)
Dept: FAMILY MEDICINE | Facility: OTHER | Age: 57
End: 2022-10-03

## 2022-10-03 DIAGNOSIS — Z12.11 SPECIAL SCREENING FOR MALIGNANT NEOPLASMS, COLON: Primary | ICD-10-CM

## 2022-10-14 ENCOUNTER — MYC REFILL (OUTPATIENT)
Dept: PEDIATRICS | Facility: OTHER | Age: 57
End: 2022-10-14

## 2022-10-14 DIAGNOSIS — F40.243 FLYING PHOBIA: ICD-10-CM

## 2022-10-17 NOTE — TELEPHONE ENCOUNTER
For your review upon you return to clinic  Naomie Najera RN on 10/17/2022 at 9:55 AM    Last Prescription Date: 6/24/22  Last Qty/Refills: 10 / 0  Last Office Visit: 2/14/22  Future Office Visit: None     Requested Prescriptions   Pending Prescriptions Disp Refills     LORazepam (ATIVAN) 0.5 MG tablet 10 tablet 0     Sig: Take 1 tablet (0.5 mg) by mouth daily as needed (flying phobia)       There is no refill protocol information for this order

## 2022-10-20 RX ORDER — LORAZEPAM 0.5 MG/1
0.5 TABLET ORAL DAILY PRN
Qty: 10 TABLET | Refills: 0 | OUTPATIENT
Start: 2022-10-20

## 2022-11-10 LAB — NONINV COLON CA DNA+OCC BLD SCRN STL QL: NEGATIVE

## 2022-12-29 ENCOUNTER — OFFICE VISIT (OUTPATIENT)
Dept: PEDIATRICS | Facility: OTHER | Age: 57
End: 2022-12-29
Attending: INTERNAL MEDICINE
Payer: COMMERCIAL

## 2022-12-29 VITALS
RESPIRATION RATE: 20 BRPM | OXYGEN SATURATION: 97 % | WEIGHT: 254 LBS | SYSTOLIC BLOOD PRESSURE: 120 MMHG | HEART RATE: 78 BPM | HEIGHT: 74 IN | DIASTOLIC BLOOD PRESSURE: 80 MMHG | BODY MASS INDEX: 32.6 KG/M2 | TEMPERATURE: 97.1 F

## 2022-12-29 DIAGNOSIS — M54.16 LUMBAR RADICULOPATHY: Primary | ICD-10-CM

## 2022-12-29 DIAGNOSIS — L72.3 SEBACEOUS CYST: ICD-10-CM

## 2022-12-29 DIAGNOSIS — E66.09 NON MORBID OBESITY DUE TO EXCESS CALORIES: ICD-10-CM

## 2022-12-29 PROCEDURE — 91312 COVID-19 VACCINE BIVALENT BOOSTER 12+ (PFIZER): CPT | Performed by: INTERNAL MEDICINE

## 2022-12-29 PROCEDURE — 99213 OFFICE O/P EST LOW 20 MIN: CPT | Mod: 25 | Performed by: INTERNAL MEDICINE

## 2022-12-29 PROCEDURE — 0124A COVID-19 VACCINE BIVALENT BOOSTER 12+ (PFIZER): CPT | Performed by: INTERNAL MEDICINE

## 2022-12-29 ASSESSMENT — ANXIETY QUESTIONNAIRES
7. FEELING AFRAID AS IF SOMETHING AWFUL MIGHT HAPPEN: NOT AT ALL
GAD7 TOTAL SCORE: 2
6. BECOMING EASILY ANNOYED OR IRRITABLE: NOT AT ALL
2. NOT BEING ABLE TO STOP OR CONTROL WORRYING: NOT AT ALL
5. BEING SO RESTLESS THAT IT IS HARD TO SIT STILL: NOT AT ALL
4. TROUBLE RELAXING: SEVERAL DAYS
7. FEELING AFRAID AS IF SOMETHING AWFUL MIGHT HAPPEN: NOT AT ALL
GAD7 TOTAL SCORE: 2
GAD7 TOTAL SCORE: 2
1. FEELING NERVOUS, ANXIOUS, OR ON EDGE: NOT AT ALL
8. IF YOU CHECKED OFF ANY PROBLEMS, HOW DIFFICULT HAVE THESE MADE IT FOR YOU TO DO YOUR WORK, TAKE CARE OF THINGS AT HOME, OR GET ALONG WITH OTHER PEOPLE?: NOT DIFFICULT AT ALL
IF YOU CHECKED OFF ANY PROBLEMS ON THIS QUESTIONNAIRE, HOW DIFFICULT HAVE THESE PROBLEMS MADE IT FOR YOU TO DO YOUR WORK, TAKE CARE OF THINGS AT HOME, OR GET ALONG WITH OTHER PEOPLE: NOT DIFFICULT AT ALL
3. WORRYING TOO MUCH ABOUT DIFFERENT THINGS: SEVERAL DAYS

## 2022-12-29 ASSESSMENT — PATIENT HEALTH QUESTIONNAIRE - PHQ9
10. IF YOU CHECKED OFF ANY PROBLEMS, HOW DIFFICULT HAVE THESE PROBLEMS MADE IT FOR YOU TO DO YOUR WORK, TAKE CARE OF THINGS AT HOME, OR GET ALONG WITH OTHER PEOPLE: NOT DIFFICULT AT ALL
SUM OF ALL RESPONSES TO PHQ QUESTIONS 1-9: 1
SUM OF ALL RESPONSES TO PHQ QUESTIONS 1-9: 1

## 2022-12-29 ASSESSMENT — PAIN SCALES - GENERAL: PAINLEVEL: SEVERE PAIN (7)

## 2022-12-29 NOTE — NURSING NOTE
"Chief Complaint   Patient presents with     Hip Pain     Right     Back Pain         Initial /80   Pulse 78   Temp 97.1  F (36.2  C) (Tympanic)   Resp 20   Ht 1.867 m (6' 1.5\")   Wt 115.2 kg (254 lb)   SpO2 97%   BMI 33.06 kg/m   Estimated body mass index is 33.06 kg/m  as calculated from the following:    Height as of this encounter: 1.867 m (6' 1.5\").    Weight as of this encounter: 115.2 kg (254 lb).         Norma J. Gosselin, LPN   "

## 2022-12-29 NOTE — PROGRESS NOTES
Assessment & Plan   1. Lumbar radiculopathy  Recommend physical therapy.  Consider lumbar MRI if symptoms persist or worsen.  Red flag signs discussed.  - Physical Therapy Referral; Future    2. Non morbid obesity due to excess calories  Recommend weight loss    3. Sebaceous cyst  Believe this is a sebaceous cyst.  We discussed the possibility of removal and decided on observation for now.  If symptoms persist or worsen would refer to general surgery.      Patient Instructions    -- Try Yoga with Carmella on YouTube      Back Pain    Modifiable Risk Factors    Maintain a healthy weight. Losing 5% can be very helpful.  A loss of 10 lbs, will result in 80 lbs of force lifted off of your back.    Core muscle strength. Keeping up with a physical therapy home exercise program, and/or regular yoga practice keeps your back healthy.    Don't use tobacco products.  People that smoke have more bulging discs, and they take longer to heal.    Try not to injure it. Be smart when doing activities that require bending, twisting and lifting such as gardening.     Treatments   -- Try Voltaren (diclofenac) gel. Apply topically to low back up to 4 times per day as needed for pain.   -- Okay to use NSAIDs by mouth, but shortest duration is best as they can hurt stomach and kidney  Example: Ibuprofen 600 mg (3 tablets) 3 times per day for 7 days, then as needed   -- Rest   -- Ice/heat whichever feels better   -- Topicals: Aspercreme/IcyHot, lidocaine, capsaicin   -- Schedule visit for physical therapy   -- Monitor for warning signs including foot drop, groin numbness, new incontinence or other concerns and return same day for evaluation   -- Otherwise, return if you are not improving over the next 4 weeks    Your BMI is Body mass index is 33.06 kg/m .  (BMI ranges: Normal 18.5 - 25, Overweight 25 - 30, Obesity greater than 30,     Morbid Obesity greater than 40 or greater than 35 with associated conditions.)    Facts about losing  weight:   -- Overweight and Obesity increase your risk for developing diabetes, high blood pressure and stroke, and shorten your life.   -- 90% of weight loss comes from dietary changes, only 10% from exercise    What should I do?   -- Work on 5-10% weight loss   -- Focus on a few healthy dietary changes   -- Eat more fresh fruits and vegetables, and fewer carbohydrates   -- Cut out all calorie-containing beverages (milk, juice, alcohol, etc)   -- Exercise every day   -- Weigh yourself once a week   -- Learn about DASH Diet for dietary ways to reduce blood pressure  1. Google: [facility] DASH diet  2. [facility] site: https://www.nhlbi.nih.gov/health-topics/dash-eating-plan  3. PDF from [facility]: https://www.nhlbi.nih.gov/files/docs/public/heart/new_dash.pdf   -- Consider Weight Watchers (http://www.weightwatchCeQur.WellRight)   -- Consider My Fitness Pal (iOS, Android, http://www.[address] [employer].WellRight)          Patient Education       Relieving Back Pain  Back pain is a common problem. You can strain back muscles by lifting too much weight or just by moving the wrong way. Back strain can be uncomfortable, even painful. And it can take weeks or months to improve. To help yourself feel better and prevent future back strains, try these tips.  Important: Don't give aspirin to children or teens without first discussing it with your child's healthcare provider.  Ice    Ice reduces muscle pain and swelling. It helps most during the first 24 to 48 hours after an injury.    Wrap an ice pack or a bag of frozen peas in a thin towel. Never put ice directly on your skin.    Place the ice where your back hurts the most.    Don t ice for more than 20 minutes at a time.    You can use ice several times a day.  Medicines  Over-the-counter pain relievers include acetaminophen and anti-inflammatory medicines, which includes aspirin, naproxen, or ibuprofen. They can help ease discomfort. Some also reduce swelling.    Tell your healthcare provider about any medicines  "you are already taking.    Take medicines only as directed.  Manipulation and massage  Having manipulation by an osteopathic doctor or chiropractor may be helpful. Getting a massage also may help.   Heat  After the first 48 hours, heat can relax sore muscles and improve blood flow.    Try a warm bath or shower. Or use a heating pad set on low. To prevent a burn, keep a cloth between you and the heating pad.    Don t use a heating pad for more than 15 minutes at a time. Never sleep on a heating pad.  Date Last Reviewed: 6/1/2018 2000-2019 [employer]. [address] [place] [address]. All rights reserved. This information is not intended as a substitute for professional medical care. Always follow your healthcare professional's instructions.              Return if symptoms worsen or fail to improve.    Signed, Chuck Galaviz MD, FAAP, FACP  Internal Medicine & Pediatrics    Subjective   Renzo Mcfarlane is a 57 year old male who presents for hip pain and lump on back.  He has been having fluctuating pain in the right upper outer buttocks radiating to the knee and occasionally to the ankle.  Worse with sitting, lying or getting out of bed in the morning and better with movement.  He is trying to do some stretches which help.  No foot drop.  No saddle anesthesia.  No fecal or urinary incontinence.  He also has a lump on his back that his children noticed at [place] time.  Its not hurting or bothering in any way.    Objective   Vitals: /80   Pulse 78   Temp 97.1  F (36.2  C) (Tympanic)   Resp 20   Ht 1.867 m (6' 1.5\")   Wt 115.2 kg (254 lb)   SpO2 97%   BMI 33.06 kg/m      Back: No midline tenderness to palpation.  Mild tenderness in upper outer buttocks on the right.  Negative straight leg raise on the right.  Neuro: Strength in ankles 5/5 bilaterally.  Musculoskeletal: No pain elicited with internal or external rotation of right hip.  Skin: Midline upper back there is an " approximately 2 cm x 2 cm round cystic structure.    Review and Analysis of Data   I

## 2022-12-29 NOTE — PATIENT INSTRUCTIONS
-- Try Yoga with Carmella on YouTube      Back Pain    Modifiable Risk Factors  Maintain a healthy weight. Losing 5% can be very helpful.  A loss of 10 lbs, will result in 80 lbs of force lifted off of your back.  Core muscle strength. Keeping up with a physical therapy home exercise program, and/or regular yoga practice keeps your back healthy.  Don't use tobacco products.  People that smoke have more bulging discs, and they take longer to heal.  Try not to injure it. Be smart when doing activities that require bending, twisting and lifting such as gardening.     Treatments   -- Try Voltaren (diclofenac) gel. Apply topically to low back up to 4 times per day as needed for pain.   -- Okay to use NSAIDs by mouth, but shortest duration is best as they can hurt stomach and kidney  Example: Ibuprofen 600 mg (3 tablets) 3 times per day for 7 days, then as needed   -- Rest   -- Ice/heat whichever feels better   -- Topicals: Aspercreme/IcyHot, lidocaine, capsaicin   -- Schedule visit for physical therapy   -- Monitor for warning signs including foot drop, groin numbness, new incontinence or other concerns and return same day for evaluation   -- Otherwise, return if you are not improving over the next 4 weeks    Your BMI is Body mass index is 33.06 kg/m .  (BMI ranges: Normal 18.5 - 25, Overweight 25 - 30, Obesity greater than 30,     Morbid Obesity greater than 40 or greater than 35 with associated conditions.)    Facts about losing weight:   -- Overweight and Obesity increase your risk for developing diabetes, high blood pressure and stroke, and shorten your life.   -- 90% of weight loss comes from dietary changes, only 10% from exercise    What should I do?   -- Work on 5-10% weight loss   -- Focus on a few healthy dietary changes   -- Eat more fresh fruits and vegetables, and fewer carbohydrates   -- Cut out all calorie-containing beverages (milk, juice, alcohol, etc)   -- Exercise every day   -- Weigh yourself once a  week   -- Learn about DASH Diet for dietary ways to reduce blood pressure  Google: [facility] DASH diet  [facility] site: https://www.nhlbi.nih.gov/health-topics/dash-eating-plan  PDF from [facility]: https://www.nhlbi.nih.gov/files/docs/public/heart/new_dash.pdf   -- Consider Weight Watchers (http://www.weightwatchers.iJigg.com)   -- Consider My Fitness Pal (iOS, Android, http://www.BookBagpal.iJigg.com)          Patient Education       Relieving Back Pain  Back pain is a common problem. You can strain back muscles by lifting too much weight or just by moving the wrong way. Back strain can be uncomfortable, even painful. And it can take weeks or months to improve. To help yourself feel better and prevent future back strains, try these tips.  Important: Don't give aspirin to children or teens without first discussing it with your child's healthcare provider.  Ice    Ice reduces muscle pain and swelling. It helps most during the first 24 to 48 hours after an injury.  Wrap an ice pack or a bag of frozen peas in a thin towel. Never put ice directly on your skin.  Place the ice where your back hurts the most.  Don t ice for more than 20 minutes at a time.  You can use ice several times a day.  Medicines  Over-the-counter pain relievers include acetaminophen and anti-inflammatory medicines, which includes aspirin, naproxen, or ibuprofen. They can help ease discomfort. Some also reduce swelling.  Tell your healthcare provider about any medicines you are already taking.  Take medicines only as directed.  Manipulation and massage  Having manipulation by an osteopathic doctor or chiropractor may be helpful. Getting a massage also may help.   Heat  After the first 48 hours, heat can relax sore muscles and improve blood flow.  Try a warm bath or shower. Or use a heating pad set on low. To prevent a burn, keep a cloth between you and the heating pad.  Don t use a heating pad for more than 15 minutes at a time. Never sleep on a heating pad.  Date Last  Reviewed: 6/1/2018 2000-2019 [employer]. [address] [place] [address], [place] [address]. All rights reserved. This information is not intended as a substitute for professional medical care. Always follow your healthcare professional's instructions.

## 2023-02-03 DIAGNOSIS — F41.0 PANIC ATTACK: ICD-10-CM

## 2023-02-03 DIAGNOSIS — F41.1 GAD (GENERALIZED ANXIETY DISORDER): ICD-10-CM

## 2023-02-06 ENCOUNTER — HOSPITAL ENCOUNTER (OUTPATIENT)
Dept: PHYSICAL THERAPY | Facility: OTHER | Age: 58
Setting detail: THERAPIES SERIES
Discharge: HOME | End: 2023-02-06
Attending: INTERNAL MEDICINE
Payer: COMMERCIAL

## 2023-02-06 DIAGNOSIS — M54.16 LUMBAR RADICULOPATHY: ICD-10-CM

## 2023-02-06 PROCEDURE — 97161 PT EVAL LOW COMPLEX 20 MIN: CPT | Mod: GP

## 2023-02-06 PROCEDURE — 97110 THERAPEUTIC EXERCISES: CPT | Mod: GP

## 2023-02-07 NOTE — PROGRESS NOTES
"   02/06/23 1500   General Information   Type of Visit Initial OP Ortho PT Evaluation   Start of Care Date 02/06/23   Referring Physician Dr. Galaviz   Patient/Family Goals Statement pt would like to have less back pain while moving around. wants to keep management conservative for as long as possible   Orders Evaluate and Treat   Date of Order 12/29/22   Certification Required? No   Medical Diagnosis lumbar radiculopathy   Surgical/Medical history reviewed Yes   Precautions/Limitations no known precautions/limitations   General Information Comments Pt is a 57 year old male referred to skilled PT services following continued slow increase in frequency of his back \"giving out\" and intermittent R LE sciatica and generalized tightness. Pt reports he has had an image from chiropractor that showed a lot of degeneration in spine, chiro helped short term but did not make much of a difference and has had really stiff lumbar spine for years. pt also reports he has lost an inch of height in the last year. 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 He will also notice more tightness and pain in the R LE but that comes and goes as well. Pt likes to hike and will complete road trips at times and wants to stay as active as possible now that he is getting closer to [facility]. pt wants to manage back and R LE pain conservatively if possible.   Body Part(s)   Body Part(s) Lumbar Spine/SI   Presentation and Etiology   Pertinent history of current problem (include personal factors and/or comorbidities that impact the POC) PMH: arthritis   Impairments A. Pain;D. Decreased ROM;H. Impaired gait;G. Impaired balance;J. Burning;K. Numbness;L. Tingling;E. Decreased flexibility   Functional Limitations perform activities of daily living;perform required work activities   Symptom Location low back, R LE   How/Where did it occur From insidious onset;From " Degenerative Joint Disease   Onset date of current episode/exacerbation 12/29/22   Chronicity Chronic  (has been going on and progressing for 35 years)   Pain rating (0-10 point scale) Best (/10);Worst (/10)   Best (/10) 2/10   Worst (/10) 8/10   Pain quality A. Sharp;B. Dull;C. Aching;D. Burning;E. Shooting;F. Stabbing;G. Cramping   Frequency of pain/symptoms B. Intermittent   Pain/symptoms are: The same all the time   Pain/symptoms exacerbated by A. Sitting;B. Walking;C. Lifting;G. Certain positions;K. Home tasks;L. Work tasks   Pain/symptoms eased by B. Walking;C. Rest;G. Heat;H. Cold;I. OTC medication(s);J. Braces/supports;K. Other   Pain eased by comment stretching helps   Progression of symptoms since onset: Unchanged   Prior Level of Function   Prior Level of Function-Mobility independent   Prior Level of Function-ADLs independent   Functional Level Prior Comment Pt likes to walk with his wife for multiple miles, enjoys hiking and has a daily stretching routine in the morning.   Current Level of Function   Current Community Support Family/friend caregiver   Patient role/employment history A. Employed   Employment Comments    Living environment [facility]   Home/community accessibility pt lives in a home with his wife   Current equipment-Gait/Locomotion None   Current equipment-ADL None   Fall Risk Screen   Fall screen completed by PT   Have you fallen 2 or more times in the past year? No   Have you fallen and had an injury in the past year? No   Is patient a fall risk? No   Abuse Screen (yes response referral indicated)   Feels Unsafe at Home or Work/School no   Feels Threatened by Someone no   Does Anyone Try to Keep You From Having Contact with Others or Doing Things Outside Your Home? no   Physical Signs of Abuse Present no   Patient needs abuse support services and resources No   Lumbar Spine/SI Objective Findings   Gait/Locomotion equal stance time, B foot clearance, no antalgic gait  (pt reports he is feeling pretty good, is not having a flair up right now)   Hamstring Flexibility limited R more than L   Piriformis Flexibility limited R more than L   Flexion ROM 80 deg (most trunk flexion coming from thoracic spine, lack of flexion at lumbar spine   Extension ROM neutral, no motion at lumbar spine, pivot point at T/L junction   Right Side Bending ROM 30 deg   Left Side Bending ROM 40 deg   Repeated Extension-Standing ROM negative (increased pressure)   Repeated Flexion-Standing ROM negative (stiffness)   Lumbar ROM Comment pt lacks an motion in lumbar spine, pt reports this has been and issue for many years   Pelvic Screen negative   Hip Screen R hip stiffness, muscle contribution, no noted arthritis or labral issues   Transversus Abdominus Strength (Chi Leg Lowering-deg) negative   Hip Flexion (L2) Strength 5/5 on L, 4/5 on R   Hip Abduction Strength 5/5   Hip Adduction Strength 4/5   Hip Extension Strength 4/5   Knee Flexion Strength 4/5 on R, 5/5 on L   Knee Extension (L3) Strength 4/5 on R 5/5 on L   Ankle Dorsiflexion (L4) Strength 5/5   SLR positive on R   Prone Instability Test negative   Crossover SLR negative   Repeated Extension Prone lack of ext in lumbar spine, did not provoke pain   Spring Test very high tension in lumbar spine, lack of PA glide in entire spine.   Segmental Mobility limited ext, all flexion comes from thoracic spine   Palpation high tissue tension in R hamstring and glut, B lumbar paraspinal tightness   Slump Test negative but more tight on the R   Observation lack of lordosis , increased flexion of thoracic spine   Posture rounded shoulders, lack of lumbar curve in seated   Planned Therapy Interventions   Planned Therapy Interventions balance training;gait training;joint mobilization;manual therapy;motor coordination training;neuromuscular re-education;ROM;strengthening;stretching   Planned Modality Interventions   Planned Modality Interventions  Cryotherapy;Electrical stimulation;Hot packs;Ultrasound;Traction;TENS   Clinical Impression   Criteria for Skilled Therapeutic Interventions Met yes, treatment indicated   PT Diagnosis core instability, lumbar spine stiffness   Influenced by the following impairments decreased tissue extensibility, joint stiffness, weakness, decreased motor control   Functional limitations due to impairments transfers, sitting tolerance, walking, lifting, sleeping   Clinical Presentation Stable/Uncomplicated   Clinical Presentation Rationale symptoms are chronic and consistent   Clinical Decision Making (Complexity) Low complexity   Therapy Frequency 2 times/Week   Predicted Duration of Therapy Intervention (days/wks) 8 weeks   Risk & Benefits of therapy have been explained Yes   Patient, Family & other staff in agreement with plan of care Yes   Clinical Impression Comments Pt is a 57 year old male referred to skilled PT services following an increase in frequency of back pain flair ups. Pt presents with high tissue and joint tension, decreased motor control of core, and R LE weakness indicating need for skilled PT services to address these deficits and decrease pt pain and conservatively manage back pain.   Education Assessment   Preferred Learning Style Listening;Reading   Barriers to Learning No barriers   ORTHO GOALS   PT Ortho Eval Goals 1;2;3   Ortho Goal 1   Goal Identifier pain   Goal Description Pt will report a 2/10 pain or less in his low back over a weeks time to increase tolerance to work and daily life.   Target Date 03/21/23   Ortho Goal 2   Goal Identifier ROM   Goal Description pt will demonstrate ability to reach to mid shin in standing trunk flexion to increase ROM for donning/doffing shoes and socks.   Target Date 03/07/23   Ortho Goal 3   Goal Identifier Endurance   Goal Description pt will demonstrate ability to hold sitting TrA contraction for 10 sec to increase core stability while driving.   Target Date  04/04/23   Total Evaluation Time   PT Eval, Low Complexity Minutes (52072) 20

## 2023-02-09 ENCOUNTER — HOSPITAL ENCOUNTER (OUTPATIENT)
Dept: PHYSICAL THERAPY | Facility: OTHER | Age: 58
Setting detail: THERAPIES SERIES
Discharge: HOME | End: 2023-02-09
Attending: INTERNAL MEDICINE
Payer: COMMERCIAL

## 2023-02-09 PROCEDURE — 97140 MANUAL THERAPY 1/> REGIONS: CPT | Mod: GP

## 2023-02-09 PROCEDURE — 97110 THERAPEUTIC EXERCISES: CPT | Mod: GP

## 2023-02-13 ENCOUNTER — HOSPITAL ENCOUNTER (OUTPATIENT)
Dept: PHYSICAL THERAPY | Facility: OTHER | Age: 58
Setting detail: THERAPIES SERIES
Discharge: HOME | End: 2023-02-13
Attending: INTERNAL MEDICINE
Payer: COMMERCIAL

## 2023-02-13 PROCEDURE — 97140 MANUAL THERAPY 1/> REGIONS: CPT | Mod: GP

## 2023-02-13 PROCEDURE — 97110 THERAPEUTIC EXERCISES: CPT | Mod: GP

## 2023-02-16 ENCOUNTER — HOSPITAL ENCOUNTER (OUTPATIENT)
Dept: PHYSICAL THERAPY | Facility: OTHER | Age: 58
Setting detail: THERAPIES SERIES
Discharge: HOME | End: 2023-02-16
Attending: INTERNAL MEDICINE
Payer: COMMERCIAL

## 2023-02-16 PROCEDURE — 97140 MANUAL THERAPY 1/> REGIONS: CPT | Mod: GP

## 2023-02-16 PROCEDURE — 97110 THERAPEUTIC EXERCISES: CPT | Mod: GP

## 2023-02-23 ENCOUNTER — HOSPITAL ENCOUNTER (OUTPATIENT)
Dept: PHYSICAL THERAPY | Facility: OTHER | Age: 58
Setting detail: THERAPIES SERIES
Discharge: HOME | End: 2023-02-23
Attending: INTERNAL MEDICINE
Payer: COMMERCIAL

## 2023-02-23 PROCEDURE — 97110 THERAPEUTIC EXERCISES: CPT | Mod: GP,CQ

## 2023-02-23 PROCEDURE — 97140 MANUAL THERAPY 1/> REGIONS: CPT | Mod: GP,CQ

## 2023-04-24 NOTE — PROGRESS NOTES
[facility] Rehabilitation Service    Outpatient Physical Therapy Discharge Note  Patient: Renzo Mcfarlane  : 1965    Beginning/End Dates of Reporting Period:  23 to 23    Referring Provider: Dr. Galaviz    Therapy Diagnosis: core instability, lumbar spine stiffness     Client Self Report: Rinku reports still notices stiffness and pain in the AM, but thinks his mobility is getting better with the HEP.    Goals:  Goal Identifier pain   Goal Description Pt will report a 2/10 pain or less in his low back over a weeks time to increase tolerance to work and daily life.   Target Date 23   Date Met      Progress (detail required for progress note): Progressing, pt having pain only in the AM now     Goal Identifier ROM   Goal Description pt will demonstrate ability to reach to mid shin in standing trunk flexion to increase ROM for donning/doffing shoes and socks.   Target Date 23   Date Met      Progress (detail required for progress note): Pt did not return for further scheduled therapy sessions     Goal Identifier Endurance   Goal Description pt will demonstrate ability to hold sitting TrA contraction for 10 sec to increase core stability while driving.   Target Date 23   Date Met      Progress (detail required for progress note): Not assessed due to pt not returning for further therapy sessions     Plan:  Discharge from therapy.    Discharge:    Reason for Discharge: Patient chooses to discontinue therapy.  Patient has failed to schedule further appointments.    Equipment Issued: [facility]    Discharge Plan: Patient to continue home program.

## 2023-05-06 ENCOUNTER — HEALTH MAINTENANCE LETTER (OUTPATIENT)
Age: 58
End: 2023-05-06

## 2024-02-12 DIAGNOSIS — F41.1 GAD (GENERALIZED ANXIETY DISORDER): ICD-10-CM

## 2024-02-12 DIAGNOSIS — F41.0 PANIC ATTACK: ICD-10-CM

## 2024-02-14 NOTE — TELEPHONE ENCOUNTER
[facility] sent Rx request for the following:      Requested Prescriptions   Pending Prescriptions Disp Refills    FLUoxetine (PROZAC) 20 MG capsule [Pharmacy Med Name: FLUOXETINE 20MG CAPSULES] 90 capsule 3     Sig: TAKE 1 CAPSULE(20 MG) BY MOUTH DAILY       SSRIs Protocol Failed - 2/14/2024 10:25 AM        Failed - Recent (12 mo) or future (30 days) visit within the authorizing provider's specialty     The patient must have completed an in-person or virtual visit within the past 12 months or has a future visit scheduled within the next 90 days with the authorizing provider s specialty.  Urgent care and e-visits do not quality as an office visit for this protocol.         Last Prescription Date:   2/7/23  Last Fill Qty/Refills:         90 cap, R-3    Last Office Visit:              12/29/22 Galaviz   Future Office visit:           None currently scheduled.     Pt has not been seen since 2022. Call placed to pt to encourage wellness visit. Message left for pt to return call to clinic.     Susan Jhaveri RN on 2/14/2024 at 10:29 AM

## 2024-02-16 NOTE — TELEPHONE ENCOUNTER
Routing to covering provider for refill consideration, as PCP/provider is out of clinic >48 hours or Pt is completely out of medication and provider is out of the clinic today.    Roxanne Butler RN .............. 2/16/2024  4:02 PM

## 2024-04-11 ENCOUNTER — OFFICE VISIT (OUTPATIENT)
Dept: PEDIATRICS | Facility: OTHER | Age: 59
End: 2024-04-11
Attending: INTERNAL MEDICINE
Payer: COMMERCIAL

## 2024-04-11 VITALS
BODY MASS INDEX: 31.26 KG/M2 | DIASTOLIC BLOOD PRESSURE: 70 MMHG | HEART RATE: 70 BPM | HEIGHT: 74 IN | TEMPERATURE: 98.3 F | RESPIRATION RATE: 16 BRPM | OXYGEN SATURATION: 95 % | SYSTOLIC BLOOD PRESSURE: 112 MMHG | WEIGHT: 243.6 LBS

## 2024-04-11 DIAGNOSIS — Z11.59 NEED FOR HEPATITIS C SCREENING TEST: ICD-10-CM

## 2024-04-11 DIAGNOSIS — R73.09 ELEVATED GLUCOSE LEVEL: ICD-10-CM

## 2024-04-11 DIAGNOSIS — F41.1 GAD (GENERALIZED ANXIETY DISORDER): ICD-10-CM

## 2024-04-11 DIAGNOSIS — B07.0 PLANTAR WART: ICD-10-CM

## 2024-04-11 DIAGNOSIS — Z11.4 SCREENING FOR HIV (HUMAN IMMUNODEFICIENCY VIRUS): ICD-10-CM

## 2024-04-11 DIAGNOSIS — Z00.00 ROUTINE GENERAL MEDICAL EXAMINATION AT A HEALTH CARE FACILITY: Primary | ICD-10-CM

## 2024-04-11 DIAGNOSIS — F41.0 PANIC ATTACK: ICD-10-CM

## 2024-04-11 DIAGNOSIS — Z13.220 LIPID SCREENING: ICD-10-CM

## 2024-04-11 DIAGNOSIS — Z12.5 SCREENING FOR PROSTATE CANCER: ICD-10-CM

## 2024-04-11 DIAGNOSIS — Z13.29 SCREENING FOR THYROID DISORDER: ICD-10-CM

## 2024-04-11 DIAGNOSIS — L80 VITILIGO: ICD-10-CM

## 2024-04-11 DIAGNOSIS — Z23 NEED FOR VACCINATION: ICD-10-CM

## 2024-04-11 DIAGNOSIS — F40.243 FLYING PHOBIA: ICD-10-CM

## 2024-04-11 LAB
ANION GAP SERPL CALCULATED.3IONS-SCNC: 9 MMOL/L (ref 7–15)
BUN SERPL-MCNC: 24.6 MG/DL (ref 6–20)
CALCIUM SERPL-MCNC: 9.7 MG/DL (ref 8.6–10)
CHLORIDE SERPL-SCNC: 103 MMOL/L (ref 98–107)
CHOLEST SERPL-MCNC: 207 MG/DL
CREAT SERPL-MCNC: 1.28 MG/DL (ref 0.67–1.17)
DEPRECATED HCO3 PLAS-SCNC: 27 MMOL/L (ref 22–29)
EGFRCR SERPLBLD CKD-EPI 2021: 65 ML/MIN/1.73M2
FASTING STATUS PATIENT QL REPORTED: NO
GLUCOSE SERPL-MCNC: 86 MG/DL (ref 70–99)
HBA1C MFR BLD: 5.6 % (ref 4–6.2)
HDLC SERPL-MCNC: 51 MG/DL
LDLC SERPL CALC-MCNC: 139 MG/DL
NONHDLC SERPL-MCNC: 156 MG/DL
POTASSIUM SERPL-SCNC: 4.5 MMOL/L (ref 3.4–5.3)
PSA SERPL DL<=0.01 NG/ML-MCNC: 1.47 NG/ML (ref 0–3.5)
SODIUM SERPL-SCNC: 139 MMOL/L (ref 135–145)
TRIGL SERPL-MCNC: 85 MG/DL
TSH SERPL DL<=0.005 MIU/L-ACNC: 1.01 UIU/ML (ref 0.3–4.2)

## 2024-04-11 PROCEDURE — 99396 PREV VISIT EST AGE 40-64: CPT | Mod: 25 | Performed by: INTERNAL MEDICINE

## 2024-04-11 PROCEDURE — 87389 HIV-1 AG W/HIV-1&-2 AB AG IA: CPT | Mod: ZL | Performed by: INTERNAL MEDICINE

## 2024-04-11 PROCEDURE — 91320 SARSCV2 VAC 30MCG TRS-SUC IM: CPT | Performed by: INTERNAL MEDICINE

## 2024-04-11 PROCEDURE — 82465 ASSAY BLD/SERUM CHOLESTEROL: CPT | Mod: ZL | Performed by: INTERNAL MEDICINE

## 2024-04-11 PROCEDURE — 36415 COLL VENOUS BLD VENIPUNCTURE: CPT | Mod: ZL | Performed by: INTERNAL MEDICINE

## 2024-04-11 PROCEDURE — 82374 ASSAY BLOOD CARBON DIOXIDE: CPT | Mod: ZL | Performed by: INTERNAL MEDICINE

## 2024-04-11 PROCEDURE — 99214 OFFICE O/P EST MOD 30 MIN: CPT | Mod: 25 | Performed by: INTERNAL MEDICINE

## 2024-04-11 PROCEDURE — 90480 ADMN SARSCOV2 VAC 1/ONLY CMP: CPT | Performed by: INTERNAL MEDICINE

## 2024-04-11 PROCEDURE — 83036 HEMOGLOBIN GLYCOSYLATED A1C: CPT | Mod: ZL | Performed by: INTERNAL MEDICINE

## 2024-04-11 PROCEDURE — 90471 IMMUNIZATION ADMIN: CPT | Performed by: INTERNAL MEDICINE

## 2024-04-11 PROCEDURE — 86803 HEPATITIS C AB TEST: CPT | Mod: ZL | Performed by: INTERNAL MEDICINE

## 2024-04-11 PROCEDURE — 84443 ASSAY THYROID STIM HORMONE: CPT | Mod: ZL | Performed by: INTERNAL MEDICINE

## 2024-04-11 PROCEDURE — 90636 HEP A/HEP B VACC ADULT IM: CPT | Performed by: INTERNAL MEDICINE

## 2024-04-11 PROCEDURE — G0103 PSA SCREENING: HCPCS | Mod: ZL | Performed by: INTERNAL MEDICINE

## 2024-04-11 RX ORDER — LORAZEPAM 0.5 MG/1
0.5 TABLET ORAL DAILY PRN
Qty: 10 TABLET | Refills: 0 | Status: SHIPPED | OUTPATIENT
Start: 2024-04-11

## 2024-04-11 RX ORDER — ZOSTER VACCINE RECOMBINANT, ADJUVANTED 50 MCG/0.5
1 KIT INTRAMUSCULAR ONCE
Qty: 0.5 ML | Refills: 1 | Status: SHIPPED | OUTPATIENT
Start: 2024-04-11 | End: 2024-04-11

## 2024-04-11 SDOH — HEALTH STABILITY: PHYSICAL HEALTH: ON AVERAGE, HOW MANY DAYS PER WEEK DO YOU ENGAGE IN MODERATE TO STRENUOUS EXERCISE (LIKE A BRISK WALK)?: 4 DAYS

## 2024-04-11 ASSESSMENT — ANXIETY QUESTIONNAIRES
2. NOT BEING ABLE TO STOP OR CONTROL WORRYING: NOT AT ALL
GAD7 TOTAL SCORE: 0
5. BEING SO RESTLESS THAT IT IS HARD TO SIT STILL: NOT AT ALL
7. FEELING AFRAID AS IF SOMETHING AWFUL MIGHT HAPPEN: NOT AT ALL
6. BECOMING EASILY ANNOYED OR IRRITABLE: NOT AT ALL
1. FEELING NERVOUS, ANXIOUS, OR ON EDGE: NOT AT ALL
GAD7 TOTAL SCORE: 0
3. WORRYING TOO MUCH ABOUT DIFFERENT THINGS: NOT AT ALL
IF YOU CHECKED OFF ANY PROBLEMS ON THIS QUESTIONNAIRE, HOW DIFFICULT HAVE THESE PROBLEMS MADE IT FOR YOU TO DO YOUR WORK, TAKE CARE OF THINGS AT HOME, OR GET ALONG WITH OTHER PEOPLE: NOT DIFFICULT AT ALL

## 2024-04-11 ASSESSMENT — SOCIAL DETERMINANTS OF HEALTH (SDOH): HOW OFTEN DO YOU GET TOGETHER WITH FRIENDS OR RELATIVES?: THREE TIMES A WEEK

## 2024-04-11 ASSESSMENT — PAIN SCALES - GENERAL: PAINLEVEL: NO PAIN (0)

## 2024-04-11 ASSESSMENT — PATIENT HEALTH QUESTIONNAIRE - PHQ9
SUM OF ALL RESPONSES TO PHQ QUESTIONS 1-9: 0
5. POOR APPETITE OR OVEREATING: NOT AT ALL

## 2024-04-11 NOTE — NURSING NOTE
"Chief Complaint   Patient presents with    Physical     annual       Initial /70   Pulse 70   Temp 98.3  F (36.8  C) (Tympanic)   Resp 16   Ht 1.867 m (6' 1.5\")   Wt 110.5 kg (243 lb 9.6 oz)   SpO2 95%   BMI 31.70 kg/m   Estimated body mass index is 31.7 kg/m  as calculated from the following:    Height as of this encounter: 1.867 m (6' 1.5\").    Weight as of this encounter: 110.5 kg (243 lb 9.6 oz).  Medication Review: complete    The next two questions are to help us understand your food security.  If you are feeling you need any assistance in this area, we have resources available to support you today.          4/11/2024   SDOH- Food Insecurity   Within the past 12 months, did you worry that your food would run out before you got money to buy more? N   Within the past 12 months, did the food you bought just not last and you didn t have money to get more? N         Health Care Directive:  Patient does not have a Health Care Directive or Living Will: Discussed advance care planning with patient; information given to patient to review.    Norma J. Gosselin, LPN      "

## 2024-04-11 NOTE — PATIENT INSTRUCTIONS
-- Start nightly home treatment (in 5-7 days):  Soak with warm water  Use pumice stone  Use Compound W / salicylic acid (glass bottle with paint brush) to wart area nightly until the wart goes away   -- Come back in 1 month if liquid nitrogen treatments are desired     -- COVID vaccine   -- Hep A/B vaccine   -- Get the new shingles vaccine series from a nurse-only visit, pharmacy or [facility] ([facility] RN).        Your BMI is Body mass index is 31.7 kg/m .  (BMI ranges: Normal 18.5 - 25, Overweight 25 - 30, Obesity greater than 30,     Morbid Obesity greater than 40 or greater than 35 with associated conditions.)    Facts about losing weight:   -- Overweight and Obesity increase your risk for developing diabetes, high blood pressure and stroke, and shorten your life.   -- 90% of weight loss comes from dietary changes, only 10% from exercise    What should I do?   -- Work on 5-10% weight loss   -- Focus on a few healthy dietary changes   -- Eat more fresh fruits and vegetables, and fewer carbohydrates   -- Cut out all calorie-containing beverages (milk, juice, alcohol, etc)   -- Exercise every day   -- Weigh yourself once a week   -- Learn about DASH Diet for dietary ways to reduce blood pressure  Google: NIH DASH diet  NIH site: https://www.nhlbi.nih.gov/health-topics/dash-eating-plan  PDF from [facility]: https://www.nhlbi.nih.gov/files/docs/public/heart/new_dash.pdf   -- Consider Weight Watchers (http://www.weightwatchers.com)   -- Consider My Fitness Pal (iOS, Android, http://www.Linkagenesspal.com)   -- Consider time-restricted eating (eg. eating between noon and 8pm only)        Patient Education     Preventive Care Advice   This is general advice given by our system to help you stay healthy. However, your care team may have specific advice just for you. Please talk to your care team about your preventive care needs.  Nutrition  Eat 5 or more servings of fruits and vegetables each day.  Try wheat bread,  brown rice and whole grain pasta (instead of white bread, rice, and pasta).  Get enough calcium and vitamin D. Check the label on foods and aim for 100% of the RDA (recommended daily allowance).  Lifestyle  Exercise at least 150 minutes each week   (30 minutes a day, 5 days a week).  Do muscle strengthening activities 2 days a week. These help control your weight and prevent disease.  No smoking.  Wear sunscreen to prevent skin cancer.  Have a dental exam and cleaning every 6 months.  Yearly exams  See your health care team every year to talk about:  Any changes in your health.  Any medicines your care team has prescribed.  Preventive care, family planning, and ways to prevent chronic diseases.  Shots (vaccines)   HPV shots (up to age 26), if you've never had them before.  Hepatitis B shots (up to age 59), if you've never had them before.  COVID-19 shot: Get this shot when it's due.  Flu shot: Get a flu shot every year.  Tetanus shot: Get a tetanus shot every 10 years.  Pneumococcal, hepatitis A, and RSV shots: Ask your care team if you need these based on your risk.  Shingles shot (for age 50 and up).  General health tests  Diabetes screening:  Starting at age 35, Get screened for diabetes at least every 3 years.  If you are younger than age 35, ask your care team if you should be screened for diabetes.  Cholesterol test: At age 39, start having a cholesterol test every 5 years, or more often if advised.  Bone density scan (DEXA): At age 50, ask your care team if you should have this scan for osteoporosis (brittle bones).  Hepatitis C: Get tested at least once in your life.  STIs (sexually transmitted infections)  Before age 24: Ask your care team if you should be screened for STIs.  After age 24: Get screened for STIs if you're at risk. You are at risk for STIs (including HIV) if:  You are sexually active with more than one person.  You don't use condoms every time.  You or a partner was diagnosed with a sexually  transmitted infection.  If you are at risk for HIV, ask about PrEP medicine to prevent HIV.  Get tested for HIV at least once in your life, whether you are at risk for HIV or not.  Cancer screening tests  Cervical cancer screening: If you have a cervix, begin getting regular cervical cancer screening tests at age 21. Most people who have regular screenings with normal results can stop after age 65. Talk about this with your provider.  Breast cancer scan (mammogram): If you've ever had breasts, begin having regular mammograms starting at age 40. This is a scan to check for breast cancer.  Colon cancer screening: It is important to start screening for colon cancer at age 45.  Have a colonoscopy test every 10 years (or more often if you're at risk) Or, ask your provider about stool tests like a FIT test every year or Cologuard test every 3 years.  To learn more about your testing options, visit: https://www.Nexopia/525359.pdf.  For help making a decision, visit: https://bit.DocDep/hx43929.  Prostate cancer screening test: If you have a prostate and are age 55 to 69, ask your provider if you would benefit from a yearly prostate cancer screening test.  Lung cancer screening: If you are a current or former smoker age 50 to 80, ask your care team if ongoing lung cancer screenings are right for you.  For informational purposes only. Not to replace the advice of your health care provider. Copyright   2023 [facility]. All rights reserved. Clinically reviewed by the [facility] Transitions Program. [employer] 759451 - REV 01/24.    Learning About Stress  What is stress?     Stress is your body's response to a hard situation. Your body can have a physical, emotional, or mental response. Stress is a fact of life for most people, and it affects everyone differently. What causes stress for you may not be stressful for someone else.  A lot of things can cause stress. You may feel stress when you go on a job  interview, take a test, or run a race. This kind of short-term stress is normal and even useful. It can help you if you need to work hard or react quickly. For example, stress can help you finish an important job on time.  Long-term stress is caused by ongoing stressful situations or events. Examples of long-term stress include long-term health problems, ongoing problems at work, or conflicts in your family. Long-term stress can harm your health.  How does stress affect your health?  When you are stressed, your body responds as though you are in danger. It makes hormones that speed up your heart, make you breathe faster, and give you a burst of energy. This is called the fight-or-flight stress response. If the stress is over quickly, your body goes back to normal and no harm is done.  But if stress happens too often or lasts too long, it can have bad effects. Long-term stress can make you more likely to get sick, and it can make symptoms of some diseases worse. If you tense up when you are stressed, you may develop neck, shoulder, or low back pain. Stress is linked to high blood pressure and heart disease.  Stress also harms your emotional health. It can make you landin, tense, or depressed. Your relationships may suffer, and you may not do well at work or school.  What can you do to manage stress?  You can try these things to help manage stress:   Do something active. Exercise or activity can help reduce stress. Walking is a great way to get started. Even everyday activities such as housecleaning or yard work can help.  Try yoga or jeferson chi. These techniques combine exercise and meditation. You may need some training at first to learn them.  Do something you enjoy. For example, listen to music or go to a movie. Practice your hobby or do volunteer work.  Meditate. This can help you relax, because you are not worrying about what happened before or what may happen in the future.  Do guided imagery. Imagine yourself in  "any setting that helps you feel calm. You can use online videos, books, or a teacher to guide you.  Do breathing exercises. For example:  From a standing position, bend forward from the waist with your knees slightly bent. Let your arms dangle close to the floor.  Breathe in slowly and deeply as you return to a standing position. Roll up slowly and lift your head last.  Hold your breath for just a few seconds in the standing position.  Breathe out slowly and bend forward from the waist.  Let your feelings out. Talk, laugh, cry, and express anger when you need to. Talking with supportive friends or family, a counselor, or a chrissy leader about your feelings is a healthy way to relieve stress. Avoid discussing your feelings with people who make you feel worse.  Write. It may help to write about things that are bothering you. This helps you find out how much stress you feel and what is causing it. When you know this, you can find better ways to cope.  What can you do to prevent stress?  You might try some of these things to help prevent stress:  Manage your time. This helps you find time to do the things you want and need to do.  Get enough sleep. Your body recovers from the stresses of the day while you are sleeping.  Get support. Your family, friends, and community can make a difference in how you experience stress.  Limit your news feed. Avoid or limit time on social media or news that may make you feel stressed.  Do something active. Exercise or activity can help reduce stress. Walking is a great way to get started.  Where can you learn more?  Go to https://www.Smore.net/patiented  Enter N032 in the search box to learn more about \"Learning About Stress.\"  Current as of: October 24, 2023               Content Version: 14.0    4153-2683 [employer].   Care instructions adapted under license by your healthcare professional. If you have questions about a medical condition or this instruction, always " ask your healthcare professional. [employer] disclaims any warranty or liability for your use of this information.

## 2024-04-11 NOTE — PROGRESS NOTES
Preventive Care Visit  [facility] AND [facility]  Chuck Galaviz MD, Internal Medicine  Apr 11, 2024      1. Routine general medical examination at a health care facility    2. NATALIIA (generalized anxiety disorder)  3. Panic attack  4. Flying phobia  Very rare use of lorazepam mostly for flying.  Still has some pills left from last time.  I recommend he put these in his locked box and save them for rare emergencies.  Recommend continue fluoxetine.  - LORazepam (ATIVAN) 0.5 MG tablet; Take 1 tablet (0.5 mg) by mouth daily as needed (flying phobia)  Dispense: 10 tablet; Refill: 0    5. Screening for HIV (human immunodeficiency virus)  - HIV Screening; Future  - HIV Screening    6. Plantar wart  We discussed treatments including cryotherapy, salicylic acid.  The patient prefers to try home treatments.    7. Need for hepatitis C screening test  - Hepatitis C Screen Reflex to HCV RNA Quant and Genotype; Future  - Hepatitis C Screen Reflex to HCV RNA Quant and Genotype    8. Elevated glucose level  - Basic metabolic panel; Future  - Hemoglobin A1c; Future  - Basic metabolic panel  - Hemoglobin A1c    9. Lipid screening  - Lipid Profile; Future  - Lipid Profile    10. Screening for prostate cancer  - Prostate Specific Antigen Screen; Future  - Prostate Specific Antigen Screen    11. Screening for thyroid disorder  - TSH with free T4 reflex; Future  - TSH with free T4 reflex    12. Need for vaccination  - zoster vaccine recombinant adjuvanted (SHINGRIX) injection; Inject 0.5 mLs into the muscle once for 1 dose  Dispense: 0.5 mL; Refill: 1    13. Vitiligo  I offered dermatology referral and the patient declined    Signed, Chuck Galaviz MD, FAAP, FACP  Internal Medicine & Pediatrics      Michael Dobbs is a 58 year old, presenting for the following:  Physical (annual)  Med management       Health Care Directive  Patient does not have a Health Care Directive or Living Will: Discussed advance care planning with  patient; information given to patient to review.    HPI              4/11/2024   General Health   How would you rate your overall physical health? Good   Feel stress (tense, anxious, or unable to sleep) Only a little   (!) STRESS CONCERN      4/11/2024   Nutrition   Three or more servings of calcium each day? Yes   Diet: Regular (no restrictions)   How many servings of fruit and vegetables per day? (!) 2-3   How many sweetened beverages each day? 0-1         4/11/2024   Exercise   Days per week of moderate/strenous exercise 4 days         4/11/2024   Social Factors   Frequency of gathering with friends or relatives Three times a week   Worry food won't last until get money to buy more No   Food not last or not have enough money for food? No   Do you have housing?  Yes   Are you worried about losing your housing? No   Lack of transportation? No   Unable to get utilities (heat,electricity)? No         4/11/2024   Fall Risk   Fallen 2 or more times in the past year? No   Trouble with walking or balance? No          4/11/2024   Dental   Dentist two times every year? Yes         4/11/2024   TB Screening   Were you born outside of the [place]? No         Today's PHQ-2 Score:       4/11/2024     3:21 PM   PHQ-2 ( 1999 Pfizer)   Q1: Little interest or pleasure in doing things 0   Q2: Feeling down, depressed or hopeless 0   PHQ-2 Score 0   Q1: Little interest or pleasure in doing things Not at all   Q2: Feeling down, depressed or hopeless Not at all   PHQ-2 Score 0           4/11/2024   Substance Use   Alcohol more than 3/day or more than 7/wk Not Applicable   Do you use any other substances recreationally? No     Social History     Tobacco Use    Smoking status: Never    Smokeless tobacco: Former     Quit date: 11/2019   Vaping Use    Vaping status: Never Used   Substance Use Topics    Alcohol use: Not Currently    Drug use: Never             4/11/2024   One time HIV Screening   Previous HIV test? No         4/11/2024   STI  "Screening   New sexual partner(s) since last STI/HIV test? No   Last PSA:   Prostate Specific Antigen Screen   Date Value Ref Range Status   04/11/2024 1.47 0.00 - 3.50 ng/mL Final     ASCVD Risk   The 10-year ASCVD risk score (Alicia LORENZO, et al., 2019) is: 5.4%    Values used to calculate the score:      Age: 58 years      Sex: Male      Is Non- : No      Diabetic: No      Tobacco smoker: No      Systolic Blood Pressure: 112 mmHg      Is BP treated: No      HDL Cholesterol: 51 mg/dL      Total Cholesterol: 207 mg/dL           Reviewed and updated as needed this visit by Provider      Problems                        Objective    Exam  /70   Pulse 70   Temp 98.3  F (36.8  C) (Tympanic)   Resp 16   Ht 1.867 m (6' 1.5\")   Wt 110.5 kg (243 lb 9.6 oz)   SpO2 95%   BMI 31.70 kg/m     Estimated body mass index is 31.7 kg/m  as calculated from the following:    Height as of this encounter: 1.867 m (6' 1.5\").    Weight as of this encounter: 110.5 kg (243 lb 9.6 oz).    Physical Exam  Gen: Alert, NAD.  Neck: No carotid bruits  CV: RRR no m/r/g  Pulm: CTAB, no w/r/r. No increased work of breathing  Msk: No LE edema  Neuro: Grossly intact  Skin: Verrucous lesion on the palmar aspect of the hand  Psychiatric: Normal affect and insight. Does not appear anxious or depressed.          Signed Electronically by: Chuck Galaviz MD    "

## 2024-04-12 LAB
HCV AB SERPL QL IA: NONREACTIVE
HIV 1+2 AB+HIV1 P24 AG SERPL QL IA: NONREACTIVE

## 2025-03-27 ENCOUNTER — PATIENT OUTREACH (OUTPATIENT)
Dept: CARE COORDINATION | Facility: CLINIC | Age: 60
End: 2025-03-27
Payer: COMMERCIAL

## 2025-05-10 DIAGNOSIS — F41.1 GAD (GENERALIZED ANXIETY DISORDER): ICD-10-CM

## 2025-05-10 DIAGNOSIS — F41.0 PANIC ATTACK: ICD-10-CM

## 2025-05-13 NOTE — TELEPHONE ENCOUNTER
Requested Prescriptions   Pending Prescriptions Disp Refills    FLUoxetine (PROZAC) 20 MG capsule [Pharmacy Med Name: FLUOXETINE 20MG CAPSULES] 90 capsule 4     Sig: TAKE 1 CAPSULE(20 MG) BY MOUTH DAILY       SSRIs Protocol Failed - 5/13/2025 11:00 AM        Failed - NATALIIA-7 score of less than 5 in past 6 months.     Please review last NATALIIA-7 score.       2/14/2022     3:10 PM 12/29/2022     8:07 AM 4/11/2024     3:22 PM   NATALIIA-7 SCORE   Total Score 2 (minimal anxiety) 2 (minimal anxiety)    Total Score 2 2 0             Failed - Recent (12 mo) or future (90 days) visit within the authorizing provider's specialty     The patient must have completed an in-person or virtual visit within the past 12 months or has a future visit scheduled within the next 90 days with the authorizing provider s specialty.  Urgent care and e-visits do not qualify as an office visit for this protocol.          Passed - Medication is active on med list and the sig matches. RN to manually verify dose and sig if red X/fail.     If the protocol passes (green check), you do not need to verify med dose and sig.    A prescription matches if they are the same clinical intention.    For Example: once daily and every morning are the same.    The protocol can not identify upper and lower case letters as matching and will fail.     For Example: Take 1 tablet (50 mg) by mouth daily     TAKE 1 TABLET (50 MG) BY MOUTH DAILY    For all fails (red x), verify dose and sig.    If the refill does match what is on file, the RN can still proceed to approve the refill request.       If they do not match, route to the appropriate provider.             Passed - Medication indicated for associated diagnosis     Medication is associated with one or more of the following diagnoses:              Anxiety             Bipolar  Depression  Obsessive-compulsive disorder             Panic disorder  Postmenopausal flushing             Premenstrual dysphoric disorder              Social phobia   Adjustment disorder with depressed mood   Mood disorder   Adjustment disorder with anxious mood          Passed - Patient is age 18 or older           Last Prescription Date:   4/11/2024  Last Fill Qty/Refills:         90, R-4     LOV: 4/11/2024  Future Office visit:   No future appointment scheduled at this time.    Overdue     MAY 9  2024 HEPATITIS B IMMUNIZATION (2 of 3 - Hep B Twinrix 3-dose series)  Last completed: Apr 11, 2024   SEP 1  2024 COVID-19 Vaccine (6 - 2024-25 season)  Last completed: Apr 11, 2024 JAN 1 2025 PHQ-2 (once per calendar year) (Yearly, January to December)  Last completed: Apr 11, 2024 APR 11 2025 YEARLY PREVENTIVE VISIT (Yearly)  Last completed: Apr 11, 2024   Never done Pneumococcal Vaccine: 50+ Years (1 of 1 - PCV)   Never done ZOSTER IMMUNIZATION (1 of 2)       Routing refill request to provider for review/approval.    Ramila Lunsford RN  ....................  5/13/2025   11:00 AM

## 2025-06-09 SDOH — HEALTH STABILITY: PHYSICAL HEALTH: ON AVERAGE, HOW MANY DAYS PER WEEK DO YOU ENGAGE IN MODERATE TO STRENUOUS EXERCISE (LIKE A BRISK WALK)?: 4 DAYS

## 2025-06-09 SDOH — HEALTH STABILITY: PHYSICAL HEALTH: ON AVERAGE, HOW MANY MINUTES DO YOU ENGAGE IN EXERCISE AT THIS LEVEL?: 60 MIN

## 2025-06-09 ASSESSMENT — ANXIETY QUESTIONNAIRES
2. NOT BEING ABLE TO STOP OR CONTROL WORRYING: NOT AT ALL
6. BECOMING EASILY ANNOYED OR IRRITABLE: NOT AT ALL
IF YOU CHECKED OFF ANY PROBLEMS ON THIS QUESTIONNAIRE, HOW DIFFICULT HAVE THESE PROBLEMS MADE IT FOR YOU TO DO YOUR WORK, TAKE CARE OF THINGS AT HOME, OR GET ALONG WITH OTHER PEOPLE: NOT DIFFICULT AT ALL
GAD7 TOTAL SCORE: 1
GAD7 TOTAL SCORE: 1
3. WORRYING TOO MUCH ABOUT DIFFERENT THINGS: NOT AT ALL
7. FEELING AFRAID AS IF SOMETHING AWFUL MIGHT HAPPEN: NOT AT ALL
7. FEELING AFRAID AS IF SOMETHING AWFUL MIGHT HAPPEN: NOT AT ALL
5. BEING SO RESTLESS THAT IT IS HARD TO SIT STILL: SEVERAL DAYS
8. IF YOU CHECKED OFF ANY PROBLEMS, HOW DIFFICULT HAVE THESE MADE IT FOR YOU TO DO YOUR WORK, TAKE CARE OF THINGS AT HOME, OR GET ALONG WITH OTHER PEOPLE?: NOT DIFFICULT AT ALL
4. TROUBLE RELAXING: NOT AT ALL
GAD7 TOTAL SCORE: 1
1. FEELING NERVOUS, ANXIOUS, OR ON EDGE: NOT AT ALL

## 2025-06-09 ASSESSMENT — SOCIAL DETERMINANTS OF HEALTH (SDOH): HOW OFTEN DO YOU GET TOGETHER WITH FRIENDS OR RELATIVES?: THREE TIMES A WEEK

## 2025-06-10 ENCOUNTER — RESULTS FOLLOW-UP (OUTPATIENT)
Dept: PEDIATRICS | Facility: OTHER | Age: 60
End: 2025-06-10

## 2025-06-10 ENCOUNTER — OFFICE VISIT (OUTPATIENT)
Dept: PEDIATRICS | Facility: OTHER | Age: 60
End: 2025-06-10
Attending: INTERNAL MEDICINE
Payer: COMMERCIAL

## 2025-06-10 VITALS
OXYGEN SATURATION: 97 % | SYSTOLIC BLOOD PRESSURE: 112 MMHG | WEIGHT: 244.4 LBS | BODY MASS INDEX: 32.39 KG/M2 | RESPIRATION RATE: 16 BRPM | TEMPERATURE: 97.4 F | HEIGHT: 73 IN | HEART RATE: 56 BPM | DIASTOLIC BLOOD PRESSURE: 72 MMHG

## 2025-06-10 DIAGNOSIS — Z00.00 ROUTINE GENERAL MEDICAL EXAMINATION AT A HEALTH CARE FACILITY: Primary | ICD-10-CM

## 2025-06-10 DIAGNOSIS — Z12.11 COLON CANCER SCREENING: ICD-10-CM

## 2025-06-10 DIAGNOSIS — Z12.5 SCREENING FOR PROSTATE CANCER: ICD-10-CM

## 2025-06-10 DIAGNOSIS — F41.0 PANIC ATTACK: ICD-10-CM

## 2025-06-10 DIAGNOSIS — E66.09 NON MORBID OBESITY DUE TO EXCESS CALORIES: ICD-10-CM

## 2025-06-10 DIAGNOSIS — Z13.220 LIPID SCREENING: ICD-10-CM

## 2025-06-10 DIAGNOSIS — F40.243 FLYING PHOBIA: ICD-10-CM

## 2025-06-10 DIAGNOSIS — R73.09 ELEVATED GLUCOSE LEVEL: ICD-10-CM

## 2025-06-10 DIAGNOSIS — F41.1 GAD (GENERALIZED ANXIETY DISORDER): ICD-10-CM

## 2025-06-10 LAB
ANION GAP SERPL CALCULATED.3IONS-SCNC: 12 MMOL/L (ref 7–15)
BUN SERPL-MCNC: 18.4 MG/DL (ref 6–20)
CALCIUM SERPL-MCNC: 9.1 MG/DL (ref 8.8–10.4)
CHLORIDE SERPL-SCNC: 100 MMOL/L (ref 98–107)
CHOLEST SERPL-MCNC: 191 MG/DL
CREAT SERPL-MCNC: 1.14 MG/DL (ref 0.67–1.17)
EGFRCR SERPLBLD CKD-EPI 2021: 75 ML/MIN/1.73M2
EST. AVERAGE GLUCOSE BLD GHB EST-MCNC: 134 MG/DL
FASTING STATUS PATIENT QL REPORTED: ABNORMAL
GLUCOSE SERPL-MCNC: 80 MG/DL (ref 70–99)
HBA1C MFR BLD: 6.3 %
HCO3 SERPL-SCNC: 23 MMOL/L (ref 22–29)
HDLC SERPL-MCNC: 57 MG/DL
LDLC SERPL CALC-MCNC: 121 MG/DL
NONHDLC SERPL-MCNC: 134 MG/DL
POTASSIUM SERPL-SCNC: 3.9 MMOL/L (ref 3.4–5.3)
PSA SERPL DL<=0.01 NG/ML-MCNC: 1.41 NG/ML (ref 0–3.5)
SODIUM SERPL-SCNC: 135 MMOL/L (ref 135–145)
TRIGL SERPL-MCNC: 64 MG/DL

## 2025-06-10 PROCEDURE — 83036 HEMOGLOBIN GLYCOSYLATED A1C: CPT | Mod: ZL | Performed by: INTERNAL MEDICINE

## 2025-06-10 PROCEDURE — 36415 COLL VENOUS BLD VENIPUNCTURE: CPT | Mod: ZL | Performed by: INTERNAL MEDICINE

## 2025-06-10 PROCEDURE — 80061 LIPID PANEL: CPT | Mod: ZL | Performed by: INTERNAL MEDICINE

## 2025-06-10 PROCEDURE — G0103 PSA SCREENING: HCPCS | Mod: ZL | Performed by: INTERNAL MEDICINE

## 2025-06-10 PROCEDURE — 80048 BASIC METABOLIC PNL TOTAL CA: CPT | Mod: ZL | Performed by: INTERNAL MEDICINE

## 2025-06-10 RX ORDER — LORAZEPAM 0.5 MG/1
0.5 TABLET ORAL DAILY PRN
Qty: 10 TABLET | Refills: 0 | Status: SHIPPED | OUTPATIENT
Start: 2025-06-10

## 2025-06-10 ASSESSMENT — PAIN SCALES - GENERAL: PAINLEVEL_OUTOF10: NO PAIN (0)

## 2025-06-10 NOTE — PATIENT INSTRUCTIONS
-- Start semaglutide    Your BMI is Body mass index is 32.02 kg/m .  (BMI ranges: Normal 18.5 - 25, Overweight 25 - 30, Obesity greater than 30,     Morbid Obesity greater than 40 or greater than 35 with associated conditions.)    Facts about losing weight:   -- Overweight and Obesity increase your risk for developing diabetes, high blood pressure and stroke, and shorten your life.   -- 90% of weight loss comes from dietary changes, only 10% from exercise    What should I do?   -- Work on 5-10% weight loss   -- Focus on a few healthy dietary changes   -- Eat more fresh fruits and vegetables, and fewer carbohydrates   -- Cut out all calorie-containing beverages (milk, juice, alcohol, etc)   -- Exercise every day   -- Weigh yourself once a week   -- Learn about DASH Diet for dietary ways to reduce blood pressure  Google: [facility] DASH diet  [facility] site: https://www.nhlbi.nih.gov/health-topics/dash-eating-plan  PDF from [facility]: https://www.nhlbi.nih.gov/files/docs/public/heart/new_dash.pdf   -- Consider Weight Watchers (http://www.weightwatchers.com)   -- Consider My Fitness Pal (iOS, Android, http://www.Layer3 TV.ZÃ¼m XR)   -- Consider time-restricted eating (eg. eating between noon and 8pm only)          Patient Education   Preventive Care Advice   This is general advice given by our system to help you stay healthy. However, your care team may have specific advice just for you. Please talk to your care team about your preventive care needs.  Nutrition  Eat 5 or more servings of fruits and vegetables each day.  Try wheat bread, brown rice and whole grain pasta (instead of white bread, rice, and pasta).  Get enough calcium and vitamin D. Check the label on foods and aim for 100% of the RDA (recommended daily allowance).  Lifestyle  Exercise at least 150 minutes each week  (30 minutes a day, 5 days a week).  Do muscle strengthening activities 2 days a week. These help control your weight and prevent disease.  No smoking.  Wear  sunscreen to prevent skin cancer.  Have a dental exam and cleaning every 6 months.  Yearly exams  See your health care team every year to talk about:  Any changes in your health.  Any medicines your care team has prescribed.  Preventive care, family planning, and ways to prevent chronic diseases.  Shots (vaccines)   HPV shots (up to age 26), if you've never had them before.  Hepatitis B shots (up to age 59), if you've never had them before.  COVID-19 shot: Get this shot when it's due.  Flu shot: Get a flu shot every year.  Tetanus shot: Get a tetanus shot every 10 years.  Pneumococcal, hepatitis A, and RSV shots: Ask your care team if you need these based on your risk.  Shingles shot (for age 50 and up)  General health tests  Diabetes screening:  Starting at age 35, Get screened for diabetes at least every 3 years.  If you are younger than age 35, ask your care team if you should be screened for diabetes.  Cholesterol test: At age 39, start having a cholesterol test every 5 years, or more often if advised.  Bone density scan (DEXA): At age 50, ask your care team if you should have this scan for osteoporosis (brittle bones).  Hepatitis C: Get tested at least once in your life.  STIs (sexually transmitted infections)  Before age 24: Ask your care team if you should be screened for STIs.  After age 24: Get screened for STIs if you're at risk. You are at risk for STIs (including HIV) if:  You are sexually active with more than one person.  You don't use condoms every time.  You or a partner was diagnosed with a sexually transmitted infection.  If you are at risk for HIV, ask about PrEP medicine to prevent HIV.  Get tested for HIV at least once in your life, whether you are at risk for HIV or not.  Cancer screening tests  Cervical cancer screening: If you have a cervix, begin getting regular cervical cancer screening tests starting at age 21.  Breast cancer scan (mammogram): If you've ever had breasts, begin having  regular mammograms starting at age 40. This is a scan to check for breast cancer.  Colon cancer screening: It is important to start screening for colon cancer at age 45.  Have a colonoscopy test every 10 years (or more often if you're at risk) Or, ask your provider about stool tests like a FIT test every year or Cologuard test every 3 years.  To learn more about your testing options, visit:   .  For help making a decision, visit:   https://bit.ly/sv39100.  Prostate cancer screening test: If you have a prostate, ask your care team if a prostate cancer screening test (PSA) at age 55 is right for you.  Lung cancer screening: If you are a current or former smoker ages 50 to 80, ask your care team if ongoing lung cancer screenings are right for you.  For informational purposes only. Not to replace the advice of your health care provider. Copyright   2023 Cameron Shoop. All rights reserved. Clinically reviewed by the [facility] Transitions Program. FABPulous [id number] - REV 01/24.

## 2025-06-10 NOTE — PROGRESS NOTES
Preventive Care Visit  [facility] AND [facility]  Chuck Galaviz MD, Internal Medicine  Narciso 10, 2025      1. Routine general medical examination at a health care facility (Primary)    2. Non morbid obesity due to excess calories  I recommend weight loss.  We discussed the risks and benefits of GLP-1 agents and decided to proceed.  Adverse effects were discussed.  - semaglutide (OZEMPIC) 2 MG/3ML pen; Inject 0.25 mg subcutaneously every 7 days.  Dispense: 3 mL; Refill: 11    3. NATALIIA (generalized anxiety disorder)  4. Panic attack  Symptoms have been very well-controlled with SSRI.  We discussed the possibility of a down upward reduction of the dose to 10 mg versus keeping the dose the same and ultimately we decided on no changes today.  - FLUoxetine (PROZAC) 20 MG capsule; Take 1 capsule (20 mg) by mouth daily.  Dispense: 90 capsule; Refill: 4    5. Flying phobia  Okay for rare use with flying.  - LORazepam (ATIVAN) 0.5 MG tablet; Take 1 tablet (0.5 mg) by mouth daily as needed (flying phobia).  Dispense: 10 tablet; Refill: 0    6. Elevated glucose level  - Hemoglobin A1c; Future  - Basic metabolic panel; Future  - Hemoglobin A1c  - Basic metabolic panel    7. Lipid screening  - Lipid Profile; Future  - Lipid Profile    8. Screening for prostate cancer  - Prostate Specific Antigen Screen; Future  - Prostate Specific Antigen Screen    9. Colon cancer screening  - COLOGUARD(EXACT SCIENCES); Future      Patient Instructions    -- Start semaglutide    Your BMI is Body mass index is 32.02 kg/m .  (BMI ranges: Normal 18.5 - 25, Overweight 25 - 30, Obesity greater than 30,     Morbid Obesity greater than 40 or greater than 35 with associated conditions.)    Facts about losing weight:   -- Overweight and Obesity increase your risk for developing diabetes, high blood pressure and stroke, and shorten your life.   -- 90% of weight loss comes from dietary changes, only 10% from exercise    What should I do?   --  Work on 5-10% weight loss   -- Focus on a few healthy dietary changes   -- Eat more fresh fruits and vegetables, and fewer carbohydrates   -- Cut out all calorie-containing beverages (milk, juice, alcohol, etc)   -- Exercise every day   -- Weigh yourself once a week   -- Learn about DASH Diet for dietary ways to reduce blood pressure  Google: [facility] DASH diet  [facility] site: https://www.nhlbi.nih.gov/health-topics/dash-eating-plan  PDF from [facility]: https://www.nhlbi.nih.gov/files/docs/public/heart/new_dash.pdf   -- Consider Weight Watchers (http://www.weightwatchers.[employer])   -- Consider My Fitness Pal (iOS, Android, http://www.Neuroware.io.[employer])   -- Consider time-restricted eating (eg. eating between noon and 8pm only)                Chuck Arroyo MD, FAAP, FACP  Internal Medicine & Pediatrics      Michael Dobbs is a 59 year old, presenting for the following:  Physical  Med management requested.        6/10/2025     4:15 PM   Additional Questions   Roomed by ross gramajo   Accompanied by self          HPI           Advance Care Planning            6/9/2025   General Health   How would you rate your overall physical health? Good   Feel stress (tense, anxious, or unable to sleep) Only a little   (!) STRESS CONCERN      6/9/2025   Nutrition   Three or more servings of calcium each day? Yes   Diet: Regular (no restrictions)   How many servings of fruit and vegetables per day? (!) 2-3   How many sweetened beverages each day? 0-1         6/9/2025   Exercise   Days per week of moderate/strenous exercise 4 days   Average minutes spent exercising at this level 60 min         6/9/2025   Social Factors   Frequency of gathering with friends or relatives Three times a week   Worry food won't last until get money to buy more No   Food not last or not have enough money for food? No   Do you have housing? (Housing is defined as stable permanent housing and does not include staying outside in a car, in a tent, in an abandoned building,  in an overnight shelter, or couch-surfing.) Yes   Are you worried about losing your housing? No   Lack of transportation? No   Unable to get utilities (heat,electricity)? No         6/9/2025   Fall Risk   Fallen 2 or more times in the past year? No   Trouble with walking or balance? No          6/9/2025   Dental   Dentist two times every year? Yes         Today's PHQ-2 Score:       6/9/2025     6:10 PM   PHQ-2 ( 1999 Pfizer)   Q1: Little interest or pleasure in doing things 0   Q2: Feeling down, depressed or hopeless 0   PHQ-2 Score 0    Q1: Little interest or pleasure in doing things Not at all   Q2: Feeling down, depressed or hopeless Not at all   PHQ-2 Score 0       Patient-reported           6/9/2025   Substance Use   Alcohol more than 3/day or more than 7/wk Not Applicable   Do you use any other substances recreationally? No     Social History     Tobacco Use    Smoking status: Never    Smokeless tobacco: Former     Quit date: 11/2019   Vaping Use    Vaping status: Never Used   Substance Use Topics    Alcohol use: Not Currently    Drug use: Never           6/9/2025   STI Screening   New sexual partner(s) since last STI/HIV test? No   Last PSA:   Prostate Specific Antigen Screen   Date Value Ref Range Status   04/11/2024 1.47 0.00 - 3.50 ng/mL Final     ASCVD Risk   The 10-year ASCVD risk score (Alicia LORENZO, et al., 2019) is: 5.9%    Values used to calculate the score:      Age: 59 years      Sex: Male      Is Non- : No      Diabetic: No      Tobacco smoker: No      Systolic Blood Pressure: 112 mmHg      Is BP treated: No      HDL Cholesterol: 51 mg/dL      Total Cholesterol: 207 mg/dL           Reviewed and updated as needed this visit by Provider   Tobacco  Allergies  Meds  Problems  Med Hx  Surg Hx  Fam Hx                     Objective    Exam  /72 (BP Location: Right arm, Patient Position: Sitting, Cuff Size: Adult Regular)   Pulse 56   Temp 97.4  F (36.3  C)  "(Tympanic)   Resp 16   Ht 1.861 m (6' 1.25\")   Wt 110.9 kg (244 lb 6.4 oz)   SpO2 97%   BMI 32.02 kg/m     Estimated body mass index is 32.02 kg/m  as calculated from the following:    Height as of this encounter: 1.861 m (6' 1.25\").    Weight as of this encounter: 110.9 kg (244 lb 6.4 oz).    Physical Exam  Gen: Alert, NAD.  Neck: No carotid bruits  CV: RRR no m/r/g  Pulm: CTAB, no w/r/r. No increased work of breathing  Msk: No LE edema  Neuro: Grossly intact  Skin: No concerning lesions.  Psychiatric: Normal affect and insight. Does not appear anxious or depressed.          Signed Electronically by: Chuck Galaviz MD    Answers submitted by the patient for this visit:  Patient Health Questionnaire (G7) (Submitted on 6/9/2025)  NATALIIA 7 TOTAL SCORE: 1    "

## 2025-06-10 NOTE — NURSING NOTE
"Chief Complaint   Patient presents with    Physical       Initial There were no vitals taken for this visit. Estimated body mass index is 31.7 kg/m  as calculated from the following:    Height as of 4/11/24: 1.867 m (6' 1.5\").    Weight as of 4/11/24: 110.5 kg (243 lb 9.6 oz).  Medication Review: complete    The next two questions are to help us understand your food security.  If you are feeling you need any assistance in this area, we have resources available to support you today.          6/9/2025   SDOH- Food Insecurity   Within the past 12 months, did you worry that your food would run out before you got money to buy more? N   Within the past 12 months, did the food you bought just not last and you didn t have money to get more? N         Health Care Directive:  Patient does not have a Health Care Directive: Discussed advance care planning with patient; however, patient declined at this time.    Emmie Nava CNA      "

## 2025-06-17 ENCOUNTER — ORDERS ONLY (AUTO-RELEASED) (OUTPATIENT)
Dept: PEDIATRICS | Facility: OTHER | Age: 60
End: 2025-06-17
Payer: COMMERCIAL

## 2025-06-17 DIAGNOSIS — Z12.11 COLON CANCER SCREENING: ICD-10-CM
